# Patient Record
Sex: FEMALE | Race: BLACK OR AFRICAN AMERICAN | NOT HISPANIC OR LATINO | URBAN - METROPOLITAN AREA
[De-identification: names, ages, dates, MRNs, and addresses within clinical notes are randomized per-mention and may not be internally consistent; named-entity substitution may affect disease eponyms.]

---

## 2020-07-25 ENCOUNTER — TELEPHONE ENCOUNTER (OUTPATIENT)
Dept: URBAN - METROPOLITAN AREA CLINIC 13 | Facility: CLINIC | Age: 45
End: 2020-07-25

## 2020-07-26 ENCOUNTER — TELEPHONE ENCOUNTER (OUTPATIENT)
Dept: URBAN - METROPOLITAN AREA CLINIC 13 | Facility: CLINIC | Age: 45
End: 2020-07-26

## 2020-07-26 RX ORDER — ISOPROPYL ALCOHOL 91 ML/100ML
TAKE 1 CAPSULE AT BEDTIME LIQUID TOPICAL
Refills: 0 | Status: ACTIVE | COMMUNITY
Start: 2011-08-01

## 2022-11-01 ENCOUNTER — TELEPHONE ENCOUNTER (OUTPATIENT)
Dept: URBAN - METROPOLITAN AREA CLINIC 107 | Facility: CLINIC | Age: 47
End: 2022-11-01

## 2022-11-21 ENCOUNTER — WEB ENCOUNTER (OUTPATIENT)
Dept: URBAN - METROPOLITAN AREA CLINIC 113 | Facility: CLINIC | Age: 47
End: 2022-11-21

## 2022-11-21 ENCOUNTER — OFFICE VISIT (OUTPATIENT)
Dept: URBAN - METROPOLITAN AREA CLINIC 113 | Facility: CLINIC | Age: 47
End: 2022-11-21
Payer: COMMERCIAL

## 2022-11-21 VITALS
RESPIRATION RATE: 20 BRPM | HEART RATE: 80 BPM | WEIGHT: 233.6 LBS | SYSTOLIC BLOOD PRESSURE: 145 MMHG | BODY MASS INDEX: 42.99 KG/M2 | DIASTOLIC BLOOD PRESSURE: 102 MMHG | TEMPERATURE: 97.3 F | HEIGHT: 62 IN

## 2022-11-21 DIAGNOSIS — R10.33 PERIUMBILICAL ABDOMINAL PAIN: ICD-10-CM

## 2022-11-21 DIAGNOSIS — K21.9 GASTROESOPHAGEAL REFLUX DISEASE, UNSPECIFIED WHETHER ESOPHAGITIS PRESENT: ICD-10-CM

## 2022-11-21 DIAGNOSIS — K50.10 CROHN'S DISEASE OF COLON WITHOUT COMPLICATION: ICD-10-CM

## 2022-11-21 DIAGNOSIS — K59.09 CHRONIC CONSTIPATION: ICD-10-CM

## 2022-11-21 DIAGNOSIS — R13.14 PHARYNGOESOPHAGEAL DYSPHAGIA: ICD-10-CM

## 2022-11-21 PROCEDURE — 99214 OFFICE O/P EST MOD 30 MIN: CPT | Performed by: NURSE PRACTITIONER

## 2022-11-21 RX ORDER — ISOPROPYL ALCOHOL 91 ML/100ML
TAKE 1 CAPSULE AT BEDTIME LIQUID TOPICAL
Refills: 0 | Status: ACTIVE | COMMUNITY
Start: 2011-08-01

## 2022-11-21 RX ORDER — FAMOTIDINE 40 MG/1
1 TABLET AT BEDTIME TABLET, FILM COATED ORAL ONCE A DAY
Qty: 30 | Refills: 11 | OUTPATIENT

## 2022-11-21 RX ORDER — PANTOPRAZOLE SODIUM 40 MG/1
1 TABLET TABLET, DELAYED RELEASE ORAL ONCE A DAY
Status: ACTIVE | COMMUNITY

## 2022-11-21 RX ORDER — LINACLOTIDE 145 UG/1
1 CAPSULE AT LEAST 30 MINUTES BEFORE THE FIRST MEAL OF THE DAY ON AN EMPTY STOMACH CAPSULE, GELATIN COATED ORAL ONCE A DAY
Qty: 30 | Refills: 11 | OUTPATIENT

## 2022-11-21 RX ORDER — FAMOTIDINE 20 MG/1
1 TABLET AT BEDTIME AS NEEDED TABLET, FILM COATED ORAL ONCE A DAY
Status: ACTIVE | COMMUNITY

## 2022-11-21 RX ORDER — ADALIMUMAB 40MG/0.8ML
0.8 ML KIT SUBCUTANEOUS
Status: ACTIVE | COMMUNITY

## 2022-11-21 RX ORDER — DICYCLOMINE HYDROCHLORIDE 10 MG/1
2 CAPSULES CAPSULE ORAL
Status: ACTIVE | COMMUNITY

## 2022-11-21 RX ORDER — SUCRALFATE 1 G/1
1 TABLET ON AN EMPTY STOMACH TABLET ORAL
Status: ACTIVE | COMMUNITY

## 2022-11-21 NOTE — HPI-TODAY'S VISIT:
This is a 46-year-old female with a history of anxiety, Crohn's colitis presenting for follow-up regarding a possible hernia. She was last seen in hospital consultation 4/26/2020 for Crohn's disease.  She reported that she had been followed by multiple gastroenterologists over years.  She reported that she had been doing well until 1 month prior to hospitalization.  She developed bloody diarrhea.  She had been on Humira previously up to the month prior to hospitalization.  She had presented to the hospital in [place] and reported a normal colonoscopy.  She was instructed to leave the hospital because of risk of coronavirus and she stated Dr. Wallace was unable to see her because of coronavirus issues.  She decided to present to the emergency department at [facility] for another opinion.  It was noted that she had been on Remicade in the past and for 4 years had been on Humira.  CT scan at [facility] demonstrated colitis in the ascending colon.  Labs were notable for iron deficiency with a hemoglobin of 8.1.  Her INR was elevated at 1.8.  She was not on Coumadin.  Liver enzymes and chemistries were normal.  She underwent a colonoscopy 4/27/2020 notable for a fair prep, congested, erythematous, red, and pseudomembrane covered mucosa in the entire examined colon status post biopsy.  Pathology: Cecal biopsy demonstrated chronic active colitis with focal intramucosal granuloma and random colon biopsies demonstrated chronic active colitis.  She has been under the care of Dr. Alexander (gastroenterologist in [place]).  Due to persistent abdominal pain, she decided to come here for evaluation. She reports pain indicated in the periumbilical and epigastric areas.  This is constant and worse when she eats.  She takes dicyclomine 10 mg 2 tablets and sucralfate which "calms it down."  She reports a sensation that food is "stopping" indicated in the epigastrium and "will not digest."  She reports worse symptoms associated with eating meat or rice.  She admits difficulty swallowing describing solid food lodging at the base of her throat relieved with drinking liquids.  She has daily episodes of heartburn on pantoprazole 40 mg daily and famotidine 40 mg at bedtime.  She admits occasional nausea.  She has Linzess 290 mcg and lactulose on hand for constipation.  If she takes Linzess daily, she has frequent diarrhea.  If she uses it as needed, she requires 2 capsules.  She uses lactulose as needed if she has bloating and Linzess is not effective.  She reports 2 days without a bowel movement after she takes a dose of Linzess.  Her stools are "runny" when she takes medication.  She denies red blood per rectum or melena.  She denies other abdominal symptoms.  She is unsure regarding weight loss. She reports an EGD and colonoscopy have been performed at [facility] within the last 2 months.  She also reports labs as well ordered by her gastroenterologist.  She has not had a recent CT scan.  She reports an upper GI series a month ago.  There has been discussion that abdominal pain may be associated with "loose mesh" from a prior hernia repair.  She has seen a surgeon in [place] who informed her there was nothing that could be done.  She is concerned regarding ongoing symptoms. She is compliant with Humira 40 mg subcu every 2 weeks.  She states she has been on this medication for 7 to 8 years.

## 2022-11-21 NOTE — HPI-OTHER HISTORIES
Labs 11/7/2022:SLE profile normal with exception of anti-DNA elevated at 17.  AMOL comprehensive panel normal.  RA less than 10.  ESR 89.  CRP 15.  Colonoscopy 4/27/2020 notable for a fair prep, congested, erythematous, red, and pseudomembrane covered mucosa in the entire examined colon status post biopsy.  Pathology: Cecal biopsy demonstrated chronic active colitis with focal intramucosal granuloma and random colon biopsies demonstrated chronic active colitis.

## 2022-11-23 PROBLEM — 40739000: Status: ACTIVE | Noted: 2022-11-21

## 2022-12-02 ENCOUNTER — TELEPHONE ENCOUNTER (OUTPATIENT)
Dept: URBAN - METROPOLITAN AREA CLINIC 113 | Facility: CLINIC | Age: 47
End: 2022-12-02

## 2022-12-02 RX ORDER — DICYCLOMINE HYDROCHLORIDE 10 MG/1
1 TABLET CAPSULE ORAL
Qty: 60 | Refills: 6

## 2022-12-02 RX ORDER — PANTOPRAZOLE SODIUM 40 MG/1
1 TABLET TABLET, DELAYED RELEASE ORAL ONCE A DAY
Qty: 30 TABLET | Refills: 6

## 2022-12-13 ENCOUNTER — OFFICE VISIT (OUTPATIENT)
Dept: URBAN - METROPOLITAN AREA CLINIC 107 | Facility: CLINIC | Age: 47
End: 2022-12-13

## 2022-12-20 ENCOUNTER — TELEPHONE ENCOUNTER (OUTPATIENT)
Dept: URBAN - METROPOLITAN AREA CLINIC 113 | Facility: CLINIC | Age: 47
End: 2022-12-20

## 2022-12-20 RX ORDER — CHLORDIAZEPOXIDE HYDROCHLORIDE AND CLIDINIUM BROMIDE 5; 2.5 MG/1; MG/1
1 CAPSULE BEFORE MEALS CAPSULE ORAL THREE TIMES A DAY
Qty: 90 | OUTPATIENT
Start: 2022-12-21 | End: 2023-01-20

## 2023-01-05 ENCOUNTER — TELEPHONE ENCOUNTER (OUTPATIENT)
Dept: URBAN - METROPOLITAN AREA CLINIC 113 | Facility: CLINIC | Age: 48
End: 2023-01-05

## 2023-01-05 RX ORDER — PANTOPRAZOLE SODIUM 40 MG/1
1 TABLET TABLET, DELAYED RELEASE ORAL TWICE A DAY
Qty: 60 TABLET | Refills: 6

## 2023-01-05 RX ORDER — FAMOTIDINE 40 MG/1
1 TABLET TABLET, FILM COATED ORAL TWICE A DAY
Qty: 60 TABLET | Refills: 6

## 2023-01-13 ENCOUNTER — OFFICE VISIT (OUTPATIENT)
Dept: URBAN - METROPOLITAN AREA CLINIC 107 | Facility: CLINIC | Age: 48
End: 2023-01-13

## 2023-01-13 RX ORDER — PANTOPRAZOLE SODIUM 40 MG/1
1 TABLET TABLET, DELAYED RELEASE ORAL TWICE A DAY
Qty: 60 TABLET | Refills: 6 | Status: ACTIVE | COMMUNITY

## 2023-01-13 RX ORDER — CHLORDIAZEPOXIDE HYDROCHLORIDE AND CLIDINIUM BROMIDE 5; 2.5 MG/1; MG/1
1 CAPSULE BEFORE MEALS CAPSULE ORAL THREE TIMES A DAY
Qty: 90 | Status: ACTIVE | COMMUNITY
Start: 2022-12-21 | End: 2023-01-20

## 2023-01-13 RX ORDER — ADALIMUMAB 40MG/0.8ML
0.8 ML KIT SUBCUTANEOUS
Status: ACTIVE | COMMUNITY

## 2023-01-13 RX ORDER — FAMOTIDINE 40 MG/1
1 TABLET TABLET, FILM COATED ORAL TWICE A DAY
Qty: 60 TABLET | Refills: 6 | Status: ACTIVE | COMMUNITY

## 2023-01-13 RX ORDER — LINACLOTIDE 145 UG/1
1 CAPSULE AT LEAST 30 MINUTES BEFORE THE FIRST MEAL OF THE DAY ON AN EMPTY STOMACH CAPSULE, GELATIN COATED ORAL ONCE A DAY
Qty: 30 | Refills: 11 | Status: ACTIVE | COMMUNITY

## 2023-01-13 RX ORDER — FAMOTIDINE 20 MG/1
1 TABLET AT BEDTIME AS NEEDED TABLET, FILM COATED ORAL ONCE A DAY
Status: ACTIVE | COMMUNITY

## 2023-01-13 RX ORDER — ISOPROPYL ALCOHOL 91 ML/100ML
TAKE 1 CAPSULE AT BEDTIME LIQUID TOPICAL
Refills: 0 | Status: ACTIVE | COMMUNITY
Start: 2011-08-01

## 2023-01-13 RX ORDER — SUCRALFATE 1 G/1
1 TABLET ON AN EMPTY STOMACH TABLET ORAL
Status: ACTIVE | COMMUNITY

## 2023-01-13 RX ORDER — DICYCLOMINE HYDROCHLORIDE 10 MG/1
1 TABLET CAPSULE ORAL
Qty: 60 | Refills: 6 | Status: ACTIVE | COMMUNITY

## 2023-01-13 NOTE — HPI-TODAY'S VISIT:
This is a 47-year-old female with a history of anxiety, Crohn's colitis on Humira 40 mg subcu every 2 weeks, GERD, pharyngoesophageal dysphagia, periumbilical abdominal pain, and chronic constipation presenting for follow-up. She was last seen 11/21/2022 after a long interval.  She had been under the care of Dr. Alexander in [place].  Due to persistent abdominal pain, she had return for further evaluation.  It was noted that colonoscopy in 2020 demonstrated active disease.  She reported recent EGD and colonoscopy within a 2-month period of time at [facility].  She reported an upper GI series a month prior as well.  She stated there was discussion that abdominal pain may be associated with "loose mesh" from prior surgical repair.  Her surgeon in [place] had informed her there was nothing that could be done.  She was scheduled for a CT scan.  She was to continue dicyclomine and Carafate as needed.  She had daily symptoms of acid reflux on pantoprazole and famotidine.  It was discussed there may be if component of functional dyspepsia.  She was to continue Carafate.  Records were requested.  Constipation was suboptimally managed and chronic.  She reported diarrhea associated with taking Linzess 290 mcg daily.  A trial of Linzess 145 mcg daily was recommended.  She was to continue lactulose as needed and consider decreasing Linzess to 72 mcg if she had diarrhea on 145.  She called our office 12/20/2022 requesting medication for abdominal pain after her CT scan.  Dicyclomine was ineffective.  CT was reviewed by Dr. Vu as negative.  She was prescribed Librax. She called our office 1/5/2023 reporting she was taking pantoprazole and famotidine twice a day for worsening reflux.  She reported this "slowed" her heartburn but did not resolve her symptoms.  She reported that Trulance was not working for constipation and that she did not have an appetite.  Pantoprazole and famotidine were refilled for twice daily therapy.  Gastric emptying study or smart pill were considerations.  A return call was made to the patient and a voicemail was left.

## 2023-01-24 ENCOUNTER — OFFICE VISIT (OUTPATIENT)
Dept: URBAN - METROPOLITAN AREA CLINIC 107 | Facility: CLINIC | Age: 48
End: 2023-01-24

## 2023-01-25 ENCOUNTER — WEB ENCOUNTER (OUTPATIENT)
Dept: URBAN - METROPOLITAN AREA CLINIC 113 | Facility: CLINIC | Age: 48
End: 2023-01-25

## 2023-01-27 ENCOUNTER — CLAIMS CREATED FROM THE CLAIM WINDOW (OUTPATIENT)
Dept: URBAN - METROPOLITAN AREA CLINIC 113 | Facility: CLINIC | Age: 48
End: 2023-01-27
Payer: COMMERCIAL

## 2023-01-27 VITALS
HEART RATE: 75 BPM | WEIGHT: 227 LBS | RESPIRATION RATE: 18 BRPM | BODY MASS INDEX: 41.77 KG/M2 | SYSTOLIC BLOOD PRESSURE: 98 MMHG | DIASTOLIC BLOOD PRESSURE: 69 MMHG | HEIGHT: 62 IN | TEMPERATURE: 97.1 F

## 2023-01-27 DIAGNOSIS — K31.A0 INTESTINAL METAPLASIA OF GASTRIC MUCOSA: ICD-10-CM

## 2023-01-27 DIAGNOSIS — K21.9 GASTROESOPHAGEAL REFLUX DISEASE, UNSPECIFIED WHETHER ESOPHAGITIS PRESENT: ICD-10-CM

## 2023-01-27 DIAGNOSIS — K59.09 CHRONIC CONSTIPATION: ICD-10-CM

## 2023-01-27 DIAGNOSIS — R10.33 PERIUMBILICAL ABDOMINAL PAIN: ICD-10-CM

## 2023-01-27 DIAGNOSIS — K50.10 CROHN'S DISEASE OF COLON WITHOUT COMPLICATION: ICD-10-CM

## 2023-01-27 PROCEDURE — 99214 OFFICE O/P EST MOD 30 MIN: CPT | Performed by: NURSE PRACTITIONER

## 2023-01-27 RX ORDER — FAMOTIDINE 40 MG/1
1 TABLET TABLET, FILM COATED ORAL TWICE A DAY
Qty: 60 TABLET | Refills: 6 | Status: ACTIVE | COMMUNITY

## 2023-01-27 RX ORDER — LINACLOTIDE 145 UG/1
1 CAPSULE AT LEAST 30 MINUTES BEFORE THE FIRST MEAL OF THE DAY ON AN EMPTY STOMACH CAPSULE, GELATIN COATED ORAL ONCE A DAY
Qty: 30 | Refills: 11 | Status: ON HOLD | COMMUNITY

## 2023-01-27 RX ORDER — HYDROCHLOROTHIAZIDE 25 MG/1
1 TABLET IN THE MORNING TABLET ORAL ONCE A DAY
Status: ACTIVE | COMMUNITY

## 2023-01-27 RX ORDER — FAMOTIDINE 20 MG/1
1 TABLET AT BEDTIME AS NEEDED TABLET, FILM COATED ORAL ONCE A DAY
Status: ACTIVE | COMMUNITY

## 2023-01-27 RX ORDER — VALSARTAN 80 MG/1
1 TABLET TABLET, FILM COATED ORAL ONCE A DAY
Status: ACTIVE | COMMUNITY

## 2023-01-27 RX ORDER — DICYCLOMINE HYDROCHLORIDE 10 MG/1
1 TABLET CAPSULE ORAL
Qty: 60 | Refills: 6 | Status: ACTIVE | COMMUNITY

## 2023-01-27 RX ORDER — ISOPROPYL ALCOHOL 91 ML/100ML
TAKE 1 CAPSULE AT BEDTIME LIQUID TOPICAL
Refills: 0 | Status: ACTIVE | COMMUNITY
Start: 2011-08-01

## 2023-01-27 RX ORDER — PLECANATIDE 3 MG/1
1 TABLET TABLET ORAL ONCE A DAY
Status: ACTIVE | COMMUNITY

## 2023-01-27 RX ORDER — PANTOPRAZOLE SODIUM 40 MG/1
1 TABLET TABLET, DELAYED RELEASE ORAL TWICE A DAY
Qty: 60 TABLET | Refills: 6 | Status: ACTIVE | COMMUNITY

## 2023-01-27 RX ORDER — ADALIMUMAB 40MG/0.8ML
0.8 ML KIT SUBCUTANEOUS
Status: ACTIVE | COMMUNITY

## 2023-01-27 RX ORDER — POLYETHYLENE GLYCOL 3350, SODIUM CHLORIDE, SODIUM BICARBONATE, POTASSIUM CHLORIDE 420; 11.2; 5.72; 1.48 G/4L; G/4L; G/4L; G/4L
WHILE ON A CLEAR LIQUID DIET DRINK 8 OZ EVERY 15 MIN UNTIL GONE 2 DAYS BEFORE COLONOSCOPY AND DRINK SECOND BOTTLE AS DIRECTED ON PREP SHEET DAY BEFORE THE COLONOSCOPY POWDER, FOR SOLUTION ORAL
Qty: 2 PACK | Refills: 0 | OUTPATIENT

## 2023-01-27 RX ORDER — SUCRALFATE 1 G/1
1 TABLET ON AN EMPTY STOMACH TABLET ORAL
Status: ACTIVE | COMMUNITY

## 2023-01-27 NOTE — HPI-TODAY'S VISIT:
This is a 47-year-old female with a history of anxiety, Crohn's colitis on Humira 40 mg subcu every 2 weeks, GERD, pharyngoesophageal dysphagia, periumbilical abdominal pain, and chronic constipation presenting for follow-up. She was last seen 11/21/2022 after a long interval.  She had been under the care of Dr. Alexander in [place].  Due to persistent abdominal pain, she had return for further evaluation.  It was noted that colonoscopy in 2020 demonstrated active disease.  She reported recent EGD and colonoscopy within a 2-month period of time at [facility].  She reported an upper GI series a month prior as well.  She stated there was discussion that abdominal pain may be associated with "loose mesh" from prior surgical repair.  Her surgeon in [place] had informed her there was nothing that could be done.  She was scheduled for a CT scan.  She was to continue dicyclomine and Carafate as needed.  She had daily symptoms of acid reflux on pantoprazole and famotidine.  It was discussed there may be if component of functional dyspepsia.  She was to continue Carafate.  Records were requested.  Constipation was suboptimally managed and chronic.  She reported diarrhea associated with taking Linzess 290 mcg daily.  A trial of Linzess 145 mcg daily was recommended.  She was to continue lactulose as needed and consider decreasing Linzess to 72 mcg if she had diarrhea on 145.  She called our office 12/20/2022 requesting medication for abdominal pain after her CT scan.  Dicyclomine was ineffective.  CT was reviewed by Dr. Vu as negative.  She was prescribed Librax. She called our office 1/5/2023 reporting she was taking pantoprazole and famotidine twice a day for worsening reflux.  She reported this "slowed" her heartburn but did not resolve her symptoms.  She reported that Trulance was not working for constipation and that she did not have an appetite.  Pantoprazole and famotidine were refilled for twice daily therapy.  Gastric emptying study or smart pill were considerations.  A return call was made to the patient and a voicemail was left. She reports insurance will not cover Librax.  She continues to take dicyclomine for abdominal pain.  This is ineffective.  She has constant pain in her abdomen indicated in the suprapubic area.  She reports that she sits up night to sleep and holds her side and rocks.  She continues to have constipation.  She is taking Trulance daily.  She is taking MiraLAX 1 capful twice a day.  She reports small-volume stools once or twice a week.  Stools are hard and p.o. are watery.  She has to strain to pass a bowel movement.  She has increased water intake and increased ingestion of vegetables.  Her symptoms are persistent.  She has worsening gas and bloating and reports she is "miserable."  She reports mild intermittent nausea without vomiting.  She is taking famotidine twice a day, pantoprazole 40 mg twice a day, and uses Carafate 1 g 2 or 3 times a day.  She has breakthrough heartburn at night reporting some relief with Pepcid.  She has burning into her throat.  She denies difficulty swallowing.  She denies any other abdominal symptoms. She reports abdominal pain improved after a hernia repair 2 years ago and then symptoms recurred. Records were obtained today from [facility] and are outlined below.

## 2023-01-27 NOTE — HPI-OTHER HISTORIES
CT abdomen and pelvis with contrast 12/20/2022:No acute inflammation or free fluid.  No bowel obstruction.  Appendix is normal. EGD 8/16/2022:Normal esophagus, no evidence of hiatal hernia, appropriately positioned gastric fundoplication, no inflammatory changes gastric body or other lesions noted, normal first inspected portion of the duodenum.  Bravo pH probe negative for pathologic reflux.  Gastric biopsy taken for CLOtest. EGD 4/26/2022:Esophageal mucosal variant, esophageal stenosis status post dilation, medium size hiatal hernia, loose appearing fundoplication, gastritis, erosive gastropathy, normal examined duodenum.  Antral biopsy demonstrated chronic antral gastritis with reactive/chemical gastropathy type changes, intestinal metaplasia, negative for dysplasia, negative for H. pylori. Colonoscopy 3/9/2022:Poor prep, normal examined terminal ileum, tortuous colon, stool in the entire examined colon, pseudopolyps in transverse colon status post biopsy, pseudopolyps in the descending colon status post biopsy, diffuse mild mucosal changes were found in the rectosigmoid colon, descending colon, transverse colon, ascending colon secondary to colitis status post biopsy.  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

## 2023-01-28 ENCOUNTER — LAB OUTSIDE AN ENCOUNTER (OUTPATIENT)
Dept: URBAN - METROPOLITAN AREA CLINIC 113 | Facility: CLINIC | Age: 48
End: 2023-01-28

## 2023-01-28 PROBLEM — 236069009: Status: ACTIVE | Noted: 2022-11-21

## 2023-02-02 PROBLEM — 50440006: Status: ACTIVE | Noted: 2022-11-21

## 2023-02-13 ENCOUNTER — TELEPHONE ENCOUNTER (OUTPATIENT)
Dept: URBAN - METROPOLITAN AREA CLINIC 113 | Facility: CLINIC | Age: 48
End: 2023-02-13

## 2023-02-13 RX ORDER — ADALIMUMAB 40MG/0.8ML: 0.8 ML KIT SUBCUTANEOUS

## 2023-03-17 ENCOUNTER — TELEPHONE ENCOUNTER (OUTPATIENT)
Dept: URBAN - METROPOLITAN AREA CLINIC 113 | Facility: CLINIC | Age: 48
End: 2023-03-17

## 2023-03-17 RX ORDER — POLYETHYLENE GLYCOL 3350, SODIUM CHLORIDE, SODIUM BICARBONATE, POTASSIUM CHLORIDE 420; 11.2; 5.72; 1.48 G/4L; G/4L; G/4L; G/4L
WHILE ON A CLEAR LIQUID DIET DRINK 8 OZ EVERY 15 MIN UNTIL GONE 2 DAYS BEFORE COLONOSCOPY AND DRINK SECOND BOTTLE AS DIRECTED ON PREP SHEET DAY BEFORE THE COLONOSCOPY POWDER, FOR SOLUTION ORAL
Qty: 2 PACK | Refills: 0
End: 2023-03-19

## 2023-03-20 ENCOUNTER — TELEPHONE ENCOUNTER (OUTPATIENT)
Dept: URBAN - METROPOLITAN AREA CLINIC 113 | Facility: CLINIC | Age: 48
End: 2023-03-20

## 2023-03-20 RX ORDER — POLYETHYLENE GLYCOL 3350, SODIUM SULFATE ANHYDROUS, SODIUM BICARBONATE, SODIUM CHLORIDE, POTASSIUM CHLORIDE 236; 22.74; 6.74; 5.86; 2.97 G/4L; G/4L; G/4L; G/4L; G/4L
AS DIRECTED POWDER, FOR SOLUTION ORAL ONCE
Qty: 236 GM | Refills: 0 | OUTPATIENT
Start: 2023-03-20 | End: 2023-03-21

## 2023-03-27 ENCOUNTER — CLAIMS CREATED FROM THE CLAIM WINDOW (OUTPATIENT)
Dept: URBAN - METROPOLITAN AREA CLINIC 4 | Facility: CLINIC | Age: 48
End: 2023-03-27
Payer: COMMERCIAL

## 2023-03-27 ENCOUNTER — OFFICE VISIT (OUTPATIENT)
Dept: URBAN - METROPOLITAN AREA SURGERY CENTER 25 | Facility: SURGERY CENTER | Age: 48
End: 2023-03-27
Payer: COMMERCIAL

## 2023-03-27 DIAGNOSIS — K50.811 CROHN'S DISEASE OF BOTH SMALL AND LARGE INTESTINE WITH RECTAL BLEEDING: ICD-10-CM

## 2023-03-27 DIAGNOSIS — K63.89 OTHER SPECIFIED DISEASES OF INTESTINE: ICD-10-CM

## 2023-03-27 PROCEDURE — 45380 COLONOSCOPY AND BIOPSY: CPT | Performed by: INTERNAL MEDICINE

## 2023-03-27 PROCEDURE — 88305 TISSUE EXAM BY PATHOLOGIST: CPT | Performed by: PATHOLOGY

## 2023-03-27 PROCEDURE — G8907 PT DOC NO EVENTS ON DISCHARG: HCPCS | Performed by: INTERNAL MEDICINE

## 2023-03-27 RX ORDER — VALSARTAN 80 MG/1
1 TABLET TABLET, FILM COATED ORAL ONCE A DAY
Status: ACTIVE | COMMUNITY

## 2023-03-27 RX ORDER — PLECANATIDE 3 MG/1
1 TABLET TABLET ORAL ONCE A DAY
Status: ACTIVE | COMMUNITY

## 2023-03-27 RX ORDER — FAMOTIDINE 20 MG/1
1 TABLET AT BEDTIME AS NEEDED TABLET, FILM COATED ORAL ONCE A DAY
Status: ACTIVE | COMMUNITY

## 2023-03-27 RX ORDER — HYDROCHLOROTHIAZIDE 25 MG/1
1 TABLET IN THE MORNING TABLET ORAL ONCE A DAY
Status: ACTIVE | COMMUNITY

## 2023-03-27 RX ORDER — LINACLOTIDE 145 UG/1
1 CAPSULE AT LEAST 30 MINUTES BEFORE THE FIRST MEAL OF THE DAY ON AN EMPTY STOMACH CAPSULE, GELATIN COATED ORAL ONCE A DAY
Qty: 30 | Refills: 11 | Status: ON HOLD | COMMUNITY

## 2023-03-27 RX ORDER — ISOPROPYL ALCOHOL 91 ML/100ML
TAKE 1 CAPSULE AT BEDTIME LIQUID TOPICAL
Refills: 0 | Status: ACTIVE | COMMUNITY
Start: 2011-08-01

## 2023-03-27 RX ORDER — SUCRALFATE 1 G/1
1 TABLET ON AN EMPTY STOMACH TABLET ORAL
Status: ACTIVE | COMMUNITY

## 2023-03-27 RX ORDER — ADALIMUMAB 40MG/0.8ML
0.8 ML KIT SUBCUTANEOUS
Status: ACTIVE | COMMUNITY

## 2023-03-27 RX ORDER — FAMOTIDINE 40 MG/1
1 TABLET TABLET, FILM COATED ORAL TWICE A DAY
Qty: 60 TABLET | Refills: 6 | Status: ACTIVE | COMMUNITY

## 2023-03-27 RX ORDER — DICYCLOMINE HYDROCHLORIDE 10 MG/1
1 TABLET CAPSULE ORAL
Qty: 60 | Refills: 6 | Status: ACTIVE | COMMUNITY

## 2023-03-27 RX ORDER — PANTOPRAZOLE SODIUM 40 MG/1
1 TABLET TABLET, DELAYED RELEASE ORAL TWICE A DAY
Qty: 60 TABLET | Refills: 6 | Status: ACTIVE | COMMUNITY

## 2023-05-22 ENCOUNTER — OFFICE VISIT (OUTPATIENT)
Dept: URBAN - METROPOLITAN AREA CLINIC 113 | Facility: CLINIC | Age: 48
End: 2023-05-22

## 2023-05-22 RX ORDER — FAMOTIDINE 20 MG/1
1 TABLET AT BEDTIME AS NEEDED TABLET, FILM COATED ORAL ONCE A DAY
Status: ACTIVE | COMMUNITY

## 2023-05-22 RX ORDER — PANTOPRAZOLE SODIUM 40 MG/1
1 TABLET TABLET, DELAYED RELEASE ORAL TWICE A DAY
Qty: 60 TABLET | Refills: 6 | Status: ACTIVE | COMMUNITY

## 2023-05-22 RX ORDER — VALSARTAN 80 MG/1
1 TABLET TABLET, FILM COATED ORAL ONCE A DAY
Status: ACTIVE | COMMUNITY

## 2023-05-22 RX ORDER — FAMOTIDINE 40 MG/1
1 TABLET TABLET, FILM COATED ORAL TWICE A DAY
Qty: 60 TABLET | Refills: 6 | Status: ACTIVE | COMMUNITY

## 2023-05-22 RX ORDER — SUCRALFATE 1 G/1
1 TABLET ON AN EMPTY STOMACH TABLET ORAL
Status: ACTIVE | COMMUNITY

## 2023-05-22 RX ORDER — ADALIMUMAB 40MG/0.8ML
0.8 ML KIT SUBCUTANEOUS
Status: ACTIVE | COMMUNITY

## 2023-05-22 RX ORDER — DICYCLOMINE HYDROCHLORIDE 10 MG/1
1 TABLET CAPSULE ORAL
Qty: 60 | Refills: 6 | Status: ACTIVE | COMMUNITY

## 2023-05-22 RX ORDER — PLECANATIDE 3 MG/1
1 TABLET TABLET ORAL ONCE A DAY
Status: ACTIVE | COMMUNITY

## 2023-05-22 RX ORDER — LINACLOTIDE 145 UG/1
1 CAPSULE AT LEAST 30 MINUTES BEFORE THE FIRST MEAL OF THE DAY ON AN EMPTY STOMACH CAPSULE, GELATIN COATED ORAL ONCE A DAY
Qty: 30 | Refills: 11 | Status: ON HOLD | COMMUNITY

## 2023-05-22 RX ORDER — HYDROCHLOROTHIAZIDE 25 MG/1
1 TABLET IN THE MORNING TABLET ORAL ONCE A DAY
Status: ACTIVE | COMMUNITY

## 2023-05-22 RX ORDER — ISOPROPYL ALCOHOL 91 ML/100ML
TAKE 1 CAPSULE AT BEDTIME LIQUID TOPICAL
Refills: 0 | Status: ACTIVE | COMMUNITY
Start: 2011-08-01

## 2023-05-22 NOTE — HPI-TODAY'S VISIT:
This is a 47-year-old female with a history of anxiety, Crohn's colitis on Humira 40 mg subcu every 2 weeks, GERD, pharyngoesophageal dysphagia, periumbilical abdominal pain, and chronic constipation presenting for follow-up. She was last seen 11/21/2022 after a long interval.  She had been under the care of Dr. Alexander in [place].  Due to persistent abdominal pain, she had return for further evaluation.  It was noted that colonoscopy in 2020 demonstrated active disease.  She reported recent EGD and colonoscopy within a 2-month period of time at [facility].  She reported an upper GI series a month prior as well.  She stated there was discussion that abdominal pain may be associated with "loose mesh" from prior surgical repair.  Her surgeon in [place] had informed her there was nothing that could be done.  She was scheduled for a CT scan.  She was to continue dicyclomine and Carafate as needed.  She had daily symptoms of acid reflux on pantoprazole and famotidine.  It was discussed there may be if component of functional dyspepsia.  She was to continue Carafate.  Records were requested.  Constipation was suboptimally managed and chronic.  She reported diarrhea associated with taking Linzess 290 mcg daily.  A trial of Linzess 145 mcg daily was recommended.  She was to continue lactulose as needed and consider decreasing Linzess to 72 mcg if she had diarrhea on 145.  She called our office 12/20/2022 requesting medication for abdominal pain after her CT scan.  Dicyclomine was ineffective.  CT was reviewed by Dr. Vu as negative.  She was prescribed Librax. She called our office 1/5/2023 reporting she was taking pantoprazole and famotidine twice a day for worsening reflux.  She reported this "slowed" her heartburn but did not resolve her symptoms.  She reported that Trulance was not working for constipation and that she did not have an appetite.  Pantoprazole and famotidine were refilled for twice daily therapy.  Gastric emptying study or smart pill were considerations.  A return call was made to the patient and a voicemail was left. She reports insurance will not cover Librax.  She continues to take dicyclomine for abdominal pain.  This is ineffective.  She has constant pain in her abdomen indicated in the suprapubic area.  She reports that she sits up night to sleep and holds her side and rocks.  She continues to have constipation.  She is taking Trulance daily.  She is taking MiraLAX 1 capful twice a day.  She reports small-volume stools once or twice a week.  Stools are hard and p.o. are watery.  She has to strain to pass a bowel movement.  She has increased water intake and increased ingestion of vegetables.  Her symptoms are persistent.  She has worsening gas and bloating and reports she is "miserable."  She reports mild intermittent nausea without vomiting.  She is taking famotidine twice a day, pantoprazole 40 mg twice a day, and uses Carafate 1 g 2 or 3 times a day.  She has breakthrough heartburn at night reporting some relief with Pepcid.  She has burning into her throat.  She denies difficulty swallowing.  She denies any other abdominal symptoms. She reports abdominal pain improved after a hernia repair 2 years ago and then symptoms recurred. Records were obtained today from [facility] and are outlined below.

## 2023-06-05 ENCOUNTER — TELEPHONE ENCOUNTER (OUTPATIENT)
Dept: URBAN - METROPOLITAN AREA CLINIC 113 | Facility: CLINIC | Age: 48
End: 2023-06-05

## 2023-06-05 RX ORDER — FAMOTIDINE 20 MG/1
1 TABLET TABLET, FILM COATED ORAL TWICE A DAY
Qty: 60 TABLET | Refills: 3

## 2023-06-05 RX ORDER — PANTOPRAZOLE SODIUM 40 MG/1
1 TABLET TABLET, DELAYED RELEASE ORAL TWICE A DAY
Qty: 60 TABLET | Refills: 3

## 2023-06-05 RX ORDER — ADALIMUMAB 40MG/0.8ML
0.8 ML KIT SUBCUTANEOUS EVERY 2 WEEKS
Qty: 1 | Refills: 11

## 2023-07-10 ENCOUNTER — OFFICE VISIT (OUTPATIENT)
Dept: URBAN - METROPOLITAN AREA CLINIC 107 | Facility: CLINIC | Age: 48
End: 2023-07-10

## 2023-07-10 RX ORDER — SUCRALFATE 1 G/1
1 TABLET ON AN EMPTY STOMACH TABLET ORAL
Status: ACTIVE | COMMUNITY

## 2023-07-10 RX ORDER — VALSARTAN 80 MG/1
1 TABLET TABLET, FILM COATED ORAL ONCE A DAY
Status: ACTIVE | COMMUNITY

## 2023-07-10 RX ORDER — HYDROCHLOROTHIAZIDE 25 MG/1
1 TABLET IN THE MORNING TABLET ORAL ONCE A DAY
Status: ACTIVE | COMMUNITY

## 2023-07-10 RX ORDER — FAMOTIDINE 40 MG/1
1 TABLET TABLET, FILM COATED ORAL TWICE A DAY
Qty: 60 TABLET | Refills: 6 | Status: ACTIVE | COMMUNITY

## 2023-07-10 RX ORDER — DICYCLOMINE HYDROCHLORIDE 10 MG/1
1 TABLET CAPSULE ORAL
Qty: 60 | Refills: 6 | Status: ACTIVE | COMMUNITY

## 2023-07-10 RX ORDER — PANTOPRAZOLE SODIUM 40 MG/1
1 TABLET TABLET, DELAYED RELEASE ORAL TWICE A DAY
Qty: 60 TABLET | Refills: 3 | Status: ACTIVE | COMMUNITY

## 2023-07-10 RX ORDER — FAMOTIDINE 20 MG/1
1 TABLET TABLET, FILM COATED ORAL TWICE A DAY
Qty: 60 TABLET | Refills: 3 | Status: ACTIVE | COMMUNITY

## 2023-07-10 RX ORDER — ADALIMUMAB 40MG/0.8ML
0.8 ML KIT SUBCUTANEOUS EVERY 2 WEEKS
Qty: 1 | Refills: 11 | Status: ACTIVE | COMMUNITY

## 2023-07-10 RX ORDER — PLECANATIDE 3 MG/1
1 TABLET TABLET ORAL ONCE A DAY
Status: ACTIVE | COMMUNITY

## 2023-07-10 RX ORDER — POLYETHYLENE GLYCOL 3350, SODIUM CHLORIDE, SODIUM BICARBONATE, POTASSIUM CHLORIDE 420; 11.2; 5.72; 1.48 G/4L; G/4L; G/4L; G/4L
WHILE ON A CLEAR LIQUID DIET DRINK 8 OZ EVERY 15 MIN UNTIL GONE 2 DAYS BEFORE COLONOSCOPY AND DRINK SECOND BOTTLE AS DIRECTED ON PREP SHEET DAY BEFORE THE COLONOSCOPY POWDER, FOR SOLUTION ORAL
Qty: 2 PACK | Refills: 0 | OUTPATIENT

## 2023-07-10 RX ORDER — ISOPROPYL ALCOHOL 91 ML/100ML
TAKE 1 CAPSULE AT BEDTIME LIQUID TOPICAL
Refills: 0 | Status: ACTIVE | COMMUNITY
Start: 2011-08-01

## 2023-07-10 RX ORDER — LINACLOTIDE 145 UG/1
1 CAPSULE AT LEAST 30 MINUTES BEFORE THE FIRST MEAL OF THE DAY ON AN EMPTY STOMACH CAPSULE, GELATIN COATED ORAL ONCE A DAY
Qty: 30 | Refills: 11 | Status: ON HOLD | COMMUNITY

## 2023-07-10 NOTE — HPI-OTHER HISTORIES
Upper GI series on 4/21/2023 revealed a normal upper GI series.  There was no abnormality in the esophagus, stomach or first part of the small intestine.  There is no reflux  Abdominal ultrasound on 4/21/2023 revealed a normal liver and gallbladder.  Common bile duct was 3 mm.  Colonoscopy on 3/27/2023 revealed a fair prep, erythema and loss of vascularity in the descending colon splenic flexure transverse colon and ascending colon.  Biopsies revealed no significant abnormality.  The terminal ileum was normal.  There are multiple medium sized diverticuli of the left colon.  CT abdomen and pelvis with contrast 12/20/2022:No acute inflammation or free fluid.  No bowel obstruction.  Appendix is normal. EGD 8/16/2022:Normal esophagus, no evidence of hiatal hernia, appropriately positioned gastric fundoplication, no inflammatory changes gastric body or other lesions noted, normal first inspected portion of the duodenum.  Bravo pH probe negative for pathologic reflux.  Gastric biopsy taken for CLOtest. EGD 4/26/2022:Esophageal mucosal variant, esophageal stenosis status post dilation, medium size hiatal hernia, loose appearing fundoplication, gastritis, erosive gastropathy, normal examined duodenum.  Antral biopsy demonstrated chronic antral gastritis with reactive/chemical gastropathy type changes, intestinal metaplasia, negative for dysplasia, negative for H. pylori. Colonoscopy 3/9/2022:Poor prep, normal examined terminal ileum, tortuous colon, stool in the entire examined colon, pseudopolyps in transverse colon status post biopsy, pseudopolyps in the descending colon status post biopsy, diffuse mild mucosal changes were found in the rectosigmoid colon, descending colon, transverse colon, ascending colon secondary to colitis status post biopsy.  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

## 2023-07-10 NOTE — HPI-TODAY'S VISIT:
This is a 47-year-old female with a history of anxiety, Crohn's colitis on Humira 40 mg subcu every 2 weeks, GERD, pharyngoesophageal dysphagia, periumbilical abdominal pain, and chronic constipation presenting for follow-up. She was seen 11/21/2022 after a long interval.  She had been under the care of Dr. Alexander in [place].  Due to persistent abdominal pain, she had return for further evaluation.  It was noted that colonoscopy in 2020 demonstrated active disease.  She reported recent EGD and colonoscopy within a 2-month period of time at [facility].  She reported an upper GI series a month prior as well.  She stated there was discussion that abdominal pain may be associated with "loose mesh" from prior surgical repair.  Her surgeon in [place] had informed her there was nothing that could be done.  She was scheduled for a CT scan.  She was to continue dicyclomine and Carafate as needed.  She had daily symptoms of acid reflux on pantoprazole and famotidine.  It was discussed there may be if component of functional dyspepsia.  She was to continue Carafate.  Records were requested.  Constipation was suboptimally managed and chronic.  She reported diarrhea associated with taking Linzess 290 mcg daily.  A trial of Linzess 145 mcg daily was recommended.  She was to continue lactulose as needed and consider decreasing Linzess to 72 mcg if she had diarrhea on 145.  She called our office 12/20/2022 requesting medication for abdominal pain after her CT scan.  Dicyclomine was ineffective.  CT was reviewed by Dr. Vu as negative.  She was prescribed Librax. She called our office 1/5/2023 reporting she was taking pantoprazole and famotidine twice a day for worsening reflux.  She reported this "slowed" her heartburn but did not resolve her symptoms.  She reported that Trulance was not working for constipation and that she did not have an appetite.  Pantoprazole and famotidine were refilled for twice daily therapy.  Gastric emptying study or smart pill were considerations.  A return call was made to the patient and a voicemail was left. She reports insurance will not cover Librax.  She continues to take dicyclomine for abdominal pain.  This is ineffective.  She has constant pain in her abdomen indicated in the suprapubic area.  She reports that she sits up night to sleep and holds her side and rocks.  She continues to have constipation.  She is taking Trulance daily.  She is taking MiraLAX 1 capful twice a day.  She reports small-volume stools once or twice a week.  Stools are hard and p.o. are watery.  She has to strain to pass a bowel movement.  She has increased water intake and increased ingestion of vegetables.  Her symptoms are persistent.  She has worsening gas and bloating and reports she is "miserable."  She reports mild intermittent nausea without vomiting.  She is taking famotidine twice a day, pantoprazole 40 mg twice a day, and uses Carafate 1 g 2 or 3 times a day.  She has breakthrough heartburn at night reporting some relief with Pepcid.  She has burning into her throat.  She denies difficulty swallowing.  She denies any other abdominal symptoms. She reports abdominal pain improved after a hernia repair 2 years ago and then symptoms recurred. Records were obtained today from [facility] and are outlined below.

## 2023-08-10 ENCOUNTER — TELEPHONE ENCOUNTER (OUTPATIENT)
Dept: URBAN - METROPOLITAN AREA CLINIC 113 | Facility: CLINIC | Age: 48
End: 2023-08-10

## 2023-08-10 RX ORDER — SUCRALFATE 1 G/1
1 TABLET ON AN EMPTY STOMACH TABLET ORAL
Qty: 90 | Refills: 6

## 2023-08-10 RX ORDER — DICYCLOMINE HYDROCHLORIDE 10 MG/1
1 TABLET CAPSULE ORAL
Qty: 60 | Refills: 6
End: 2024-03-07

## 2023-08-22 ENCOUNTER — OFFICE VISIT (OUTPATIENT)
Dept: URBAN - METROPOLITAN AREA CLINIC 107 | Facility: CLINIC | Age: 48
End: 2023-08-22

## 2023-08-22 ENCOUNTER — TELEPHONE ENCOUNTER (OUTPATIENT)
Dept: URBAN - METROPOLITAN AREA CLINIC 113 | Facility: CLINIC | Age: 48
End: 2023-08-22

## 2023-08-22 NOTE — HPI-TODAY'S VISIT:
This is a 47-year-old female with a history of anxiety, Crohn's colitis on Humira 40 mg subcu every 2 weeks, GERD, pharyngoesophageal dysphagia, periumbilical abdominal pain, and constipation presenting for follow-up. She was last seen in the office 1/27/2023.  She was taking pantoprazole 40 mg twice a day, famotidine 40 mg twice a day, and Carafate 2-3 times per day.  She had persistent burning in her throat at night.  She obtained some relief with taking Pepcid.  The EGD at [facility] was notable for intestinal metaplasia of gastric mucosa and was otherwise unremarkable.  Bravo pH testing was negative for evidence of reflux.  It was discussed there was a component of functional dyspepsia.  EGD for surveillance of intestinal metaplasia planned in 3 years.  She was taking Humira 40 mg subcu every 2 weeks for Crohn's colitis.  Colonoscopy by Dr. Chiu in 2020 showed active disease.  Colonoscopy in March 2022 showed mild mucosal changes and a poor prep.  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  Repeat colonoscopy with a 2-day bowel prep was recommended.  Regarding chronic abdominal pain, endoscopic evaluation and cross-sectional imaging were negative for source.  It was discussed this was likely functional and may have a component of visceral hypersensitivity.  It was also discussed that chronic constipation was likely contributing.  She had not responded to Linzess or Trulance and MiraLAX twice a day.  Motegrity was a consideration but it is doubtful that it would be covered by Medicaid.  She was prescribed Amitiza 24 mcg twice a day.  If this was ineffective, the plan was to attempt coverage for Motegrity.  She was to continue MiraLAX with either of these medications.  A nonnarcotic pain modulator was a future consideration.  Follow-up was scheduled with Dr. Vu 2 months later and she did not return for follow-up.

## 2023-08-29 ENCOUNTER — TELEPHONE ENCOUNTER (OUTPATIENT)
Dept: URBAN - METROPOLITAN AREA CLINIC 113 | Facility: CLINIC | Age: 48
End: 2023-08-29

## 2023-08-31 ENCOUNTER — OFFICE VISIT (OUTPATIENT)
Dept: URBAN - METROPOLITAN AREA CLINIC 113 | Facility: CLINIC | Age: 48
End: 2023-08-31

## 2023-08-31 RX ORDER — ISOPROPYL ALCOHOL 91 ML/100ML
TAKE 1 CAPSULE AT BEDTIME LIQUID TOPICAL
Refills: 0 | Status: ACTIVE | COMMUNITY
Start: 2011-08-01

## 2023-08-31 RX ORDER — DICYCLOMINE HYDROCHLORIDE 10 MG/1
1 TABLET CAPSULE ORAL
Qty: 60 | Refills: 6 | Status: ACTIVE | COMMUNITY
End: 2024-03-07

## 2023-08-31 RX ORDER — SUCRALFATE 1 G/1
1 TABLET ON AN EMPTY STOMACH TABLET ORAL
Qty: 90 | Refills: 6 | Status: ACTIVE | COMMUNITY

## 2023-08-31 RX ORDER — PLECANATIDE 3 MG/1
1 TABLET TABLET ORAL ONCE A DAY
Status: ACTIVE | COMMUNITY

## 2023-08-31 RX ORDER — PANTOPRAZOLE SODIUM 40 MG/1
1 TABLET TABLET, DELAYED RELEASE ORAL TWICE A DAY
Qty: 60 TABLET | Refills: 3 | Status: ACTIVE | COMMUNITY

## 2023-08-31 RX ORDER — POLYETHYLENE GLYCOL 3350, SODIUM CHLORIDE, SODIUM BICARBONATE, POTASSIUM CHLORIDE 420; 11.2; 5.72; 1.48 G/4L; G/4L; G/4L; G/4L
WHILE ON A CLEAR LIQUID DIET DRINK 8 OZ EVERY 15 MIN UNTIL GONE 2 DAYS BEFORE COLONOSCOPY AND DRINK SECOND BOTTLE AS DIRECTED ON PREP SHEET DAY BEFORE THE COLONOSCOPY POWDER, FOR SOLUTION ORAL
Qty: 2 PACK | Refills: 0 | Status: ACTIVE | COMMUNITY

## 2023-08-31 RX ORDER — FAMOTIDINE 20 MG/1
1 TABLET TABLET, FILM COATED ORAL TWICE A DAY
Qty: 60 TABLET | Refills: 3 | Status: ACTIVE | COMMUNITY

## 2023-08-31 RX ORDER — LINACLOTIDE 145 UG/1
1 CAPSULE AT LEAST 30 MINUTES BEFORE THE FIRST MEAL OF THE DAY ON AN EMPTY STOMACH CAPSULE, GELATIN COATED ORAL ONCE A DAY
Qty: 30 | Refills: 11 | Status: ON HOLD | COMMUNITY

## 2023-08-31 RX ORDER — ADALIMUMAB 40MG/0.8ML
0.8 ML KIT SUBCUTANEOUS EVERY 2 WEEKS
Qty: 1 | Refills: 11 | Status: ACTIVE | COMMUNITY

## 2023-08-31 RX ORDER — HYDROCHLOROTHIAZIDE 25 MG/1
1 TABLET IN THE MORNING TABLET ORAL ONCE A DAY
Status: ACTIVE | COMMUNITY

## 2023-08-31 RX ORDER — VALSARTAN 80 MG/1
1 TABLET TABLET, FILM COATED ORAL ONCE A DAY
Status: ACTIVE | COMMUNITY

## 2023-08-31 RX ORDER — FAMOTIDINE 40 MG/1
1 TABLET TABLET, FILM COATED ORAL TWICE A DAY
Qty: 60 TABLET | Refills: 6 | Status: ACTIVE | COMMUNITY

## 2023-08-31 NOTE — HPI-TODAY'S VISIT:
This is a 47-year-old female with a history of anxiety, Crohn's colitis on Humira 40 mg subcu every 2 weeks, GERD, pharyngoesophageal dysphagia, periumbilical abdominal pain, and constipation presenting for follow-up. She was last seen in the office 1/27/2023.  She missed multiple follow-up visits subsequently, on 5/22/23, 7/10/2023 and 8/22/2023.   She was initially diagnosed as having Crohn's disease in 2003.  Biopsies at that time were consistent with Crohn's, with intramucosal noncaseating granulomata being seen on biopsy.  Subsequent follow-up by Dr. Chiu showed active colitis on several occasions.    She was seen 11/21/2022 after a long interval.  She had been under the care of Dr. Alexander in [place].  Due to persistent abdominal pain, she had return for further evaluation.  It was noted that colonoscopy in 2020 had demonstrated active disease.  She reported recent EGD and colonoscopy within a 2-month period of time at [facility].  She reported an upper GI series a month prior as well.  She stated there was discussion that abdominal pain may be associated with "loose mesh" from prior surgical repair.  Her surgeon in [place] had informed her there was nothing that could be done.    Colonoscopy 3/9/2022:Poor prep, normal examined terminal ileum, tortuous colon, stool in the entire examined colon, pseudopolyps in transverse colon status post biopsy, pseudopolyps in the descending colon status post biopsy, diffuse mild mucosal changes were found in the rectosigmoid colon, descending colon, transverse colon, ascending colon secondary to colitis status post biopsy.  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  EGD 4/26/2022:Esophageal mucosal variant, esophageal stenosis status post dilation, medium size hiatal hernia, loose appearing fundoplication, gastritis, erosive gastropathy, normal examined duodenum.  Antral biopsy demonstrated chronic antral gastritis with reactive/chemical gastropathy type changes, intestinal metaplasia, negative for dysplasia, negative for H. pylori.  EGD 8/16/2022:Normal esophagus, no evidence of hiatal hernia, appropriately positioned gastric fundoplication, no inflammatory changes gastric body or other lesions noted, normal first inspected portion of the duodenum.  Bravo pH probe negative for pathologic reflux.  Gastric biopsy taken for CLOtest.  She was scheduled for a CT scan after her 11/21/22 visit.  She was to continue dicyclomine and Carafate as needed.  She had daily symptoms of acid reflux on pantoprazole and famotidine.  It was discussed there may be a component of functional dyspepsia.  She was to continue Carafate.  Records were requested.    Constipation was suboptimally managed and chronic.  She reported diarrhea associated with taking Linzess 290 mcg daily.  A trial of Linzess 145 mcg daily was recommended.  She was to continue lactulose as needed and consider decreasing Linzess to 72 mcg if she had diarrhea on 145.  She called our office 12/20/2022 requesting medication for abdominal pain after her CT scan.  Dicyclomine was ineffective.  CT was negative (CT abdomen and pelvis with contrast 12/20/2022:No acute inflammation or free fluid.  No bowel obstruction.  Appendix is normal).  She was prescribed Librax.  She called our office 1/5/2023 reporting she was taking pantoprazole and famotidine twice a day for worsening reflux.  She reported this "slowed" her heartburn but did not resolve her symptoms.  She reported that Trulance was not working for constipation and that she did not have an appetite.  Pantoprazole and famotidine were refilled for twice daily therapy.  Gastric emptying study or smart pill were considerations.  A return call was made to the patient and a voicemail was left.  She reported that insurance will not cover Librax.  She continued to take dicyclomine for abdominal pain which was ineffective.  She has constant suprapubic pain and ongoing constipation despite taking Trulance  3 mg daily and MiraLAX 1 capful twice a day.  She was having small-volume hard stools once or twice a week on this regimen. She had to strain to pass a bowel movement.  She described worsening gas and bloating and reported she was "miserable."    She also described mild intermittent nausea without vomiting.  She is taking famotidine twice a day, pantoprazole 40 mg twice a day, and uses Carafate 1 g 2 or 3 times a day.  She has breakthrough heartburn at night reporting some relief with Pepcid.  She has burning into her throat.  She denies difficulty swallowing.  She denies any other abdominal symptoms.  She reports abdominal pain improved after a hernia repair 2 years ago and then symptoms recurred.  After her January 27, 2023 visit, she underwent an upper GI series on April 21, 2023 at [facility] which was normal, and abdominal sonogram on the same date, also at [place], which was normal.  Colonoscopy done on March 27, 2023 showed mild to moderately active patchy colitis throughout the colon, but biopsies were negative for any pathology or dysplasia.  Notably, the biopsies showed no active disease.

## 2023-09-01 ENCOUNTER — TELEPHONE ENCOUNTER (OUTPATIENT)
Dept: URBAN - METROPOLITAN AREA CLINIC 113 | Facility: CLINIC | Age: 48
End: 2023-09-01

## 2023-09-02 ENCOUNTER — CLAIMS CREATED FROM THE CLAIM WINDOW (OUTPATIENT)
Dept: URBAN - METROPOLITAN AREA MEDICAL CENTER 19 | Facility: MEDICAL CENTER | Age: 48
End: 2023-09-02
Payer: COMMERCIAL

## 2023-09-02 DIAGNOSIS — R19.7 DIARRHEA, UNSPECIFIED: ICD-10-CM

## 2023-09-02 DIAGNOSIS — R10.84 ABDOMINAL PAIN, GENERALIZED: ICD-10-CM

## 2023-09-02 DIAGNOSIS — D64.89 OTHER SPECIFIED ANEMIAS: ICD-10-CM

## 2023-09-02 DIAGNOSIS — K50.118 CROHN'S COLITIS, OTHER COMPLICATION: ICD-10-CM

## 2023-09-02 PROCEDURE — 99253 IP/OBS CNSLTJ NEW/EST LOW 45: CPT | Performed by: INTERNAL MEDICINE

## 2023-09-02 PROCEDURE — 99221 1ST HOSP IP/OBS SF/LOW 40: CPT | Performed by: INTERNAL MEDICINE

## 2023-09-03 ENCOUNTER — CLAIMS CREATED FROM THE CLAIM WINDOW (OUTPATIENT)
Dept: URBAN - METROPOLITAN AREA MEDICAL CENTER 19 | Facility: MEDICAL CENTER | Age: 48
End: 2023-09-03
Payer: COMMERCIAL

## 2023-09-03 DIAGNOSIS — D64.89 OTHER SPECIFIED ANEMIAS: ICD-10-CM

## 2023-09-03 DIAGNOSIS — R10.84 ABDOMINAL PAIN, GENERALIZED: ICD-10-CM

## 2023-09-03 DIAGNOSIS — K50.118 CROHN'S DISEASE OF COLON WITH OTHER COMPLICATION: ICD-10-CM

## 2023-09-03 PROCEDURE — 99232 SBSQ HOSP IP/OBS MODERATE 35: CPT | Performed by: INTERNAL MEDICINE

## 2023-09-04 ENCOUNTER — CLAIMS CREATED FROM THE CLAIM WINDOW (OUTPATIENT)
Dept: URBAN - METROPOLITAN AREA MEDICAL CENTER 19 | Facility: MEDICAL CENTER | Age: 48
End: 2023-09-04
Payer: COMMERCIAL

## 2023-09-04 DIAGNOSIS — R10.84 ABDOMINAL PAIN, GENERALIZED: ICD-10-CM

## 2023-09-04 DIAGNOSIS — K50.118: ICD-10-CM

## 2023-09-04 PROCEDURE — 99232 SBSQ HOSP IP/OBS MODERATE 35: CPT | Performed by: INTERNAL MEDICINE

## 2023-09-13 ENCOUNTER — WEB ENCOUNTER (OUTPATIENT)
Dept: URBAN - METROPOLITAN AREA CLINIC 107 | Facility: CLINIC | Age: 48
End: 2023-09-13

## 2023-09-13 ENCOUNTER — TELEPHONE ENCOUNTER (OUTPATIENT)
Dept: URBAN - METROPOLITAN AREA CLINIC 113 | Facility: CLINIC | Age: 48
End: 2023-09-13

## 2023-09-13 ENCOUNTER — OFFICE VISIT (OUTPATIENT)
Dept: URBAN - METROPOLITAN AREA CLINIC 107 | Facility: CLINIC | Age: 48
End: 2023-09-13
Payer: COMMERCIAL

## 2023-09-13 ENCOUNTER — TELEPHONE ENCOUNTER (OUTPATIENT)
Dept: URBAN - METROPOLITAN AREA CLINIC 107 | Facility: CLINIC | Age: 48
End: 2023-09-13

## 2023-09-13 VITALS
SYSTOLIC BLOOD PRESSURE: 111 MMHG | HEIGHT: 62 IN | TEMPERATURE: 96.5 F | WEIGHT: 219 LBS | BODY MASS INDEX: 40.3 KG/M2 | HEART RATE: 84 BPM | DIASTOLIC BLOOD PRESSURE: 77 MMHG | RESPIRATION RATE: 18 BRPM

## 2023-09-13 DIAGNOSIS — K50.10 CROHN'S DISEASE OF LARGE INTESTINE WITHOUT COMPLICATION: ICD-10-CM

## 2023-09-13 DIAGNOSIS — K62.5 HEMORRHAGE OF ANUS AND RECTUM: ICD-10-CM

## 2023-09-13 PROBLEM — 7620006: Status: ACTIVE | Noted: 2023-09-13

## 2023-09-13 PROCEDURE — 99215 OFFICE O/P EST HI 40 MIN: CPT | Performed by: INTERNAL MEDICINE

## 2023-09-13 RX ORDER — FAMOTIDINE 40 MG/1
1 TABLET TABLET, FILM COATED ORAL TWICE A DAY
Qty: 60 TABLET | Refills: 6 | Status: ACTIVE | COMMUNITY

## 2023-09-13 RX ORDER — PLECANATIDE 3 MG/1
1 TABLET TABLET ORAL ONCE A DAY
Status: ACTIVE | COMMUNITY

## 2023-09-13 RX ORDER — PREDNISONE 10 MG/1
4 TABLETS TABLET ORAL ONCE A DAY
Qty: 120 TABLET | Refills: 1 | OUTPATIENT
Start: 2023-09-13 | End: 2023-11-11

## 2023-09-13 RX ORDER — HYDROCHLOROTHIAZIDE 25 MG/1
1 TABLET IN THE MORNING TABLET ORAL ONCE A DAY
Status: ACTIVE | COMMUNITY

## 2023-09-13 RX ORDER — CHLORDIAZEPOXIDE HYDROCHLORIDE AND CLIDINIUM BROMIDE 5; 2.5 MG/1; MG/1
1 CAPSULE BEFORE MEALS CAPSULE ORAL TWICE A DAY
Qty: 60 | Refills: 1 | OUTPATIENT
Start: 2023-09-13 | End: 2023-11-11

## 2023-09-13 RX ORDER — PANTOPRAZOLE SODIUM 40 MG/1
1 TABLET TABLET, DELAYED RELEASE ORAL TWICE A DAY
Qty: 60 TABLET | Refills: 3 | Status: ACTIVE | COMMUNITY

## 2023-09-13 RX ORDER — DICYCLOMINE HYDROCHLORIDE 10 MG/1
1 TABLET CAPSULE ORAL
Qty: 60 | Refills: 6 | Status: ACTIVE | COMMUNITY
End: 2024-03-07

## 2023-09-13 RX ORDER — VALSARTAN 80 MG/1
1 TABLET TABLET, FILM COATED ORAL ONCE A DAY
Status: ACTIVE | COMMUNITY

## 2023-09-13 RX ORDER — ISOPROPYL ALCOHOL 91 ML/100ML
TAKE 1 CAPSULE AT BEDTIME LIQUID TOPICAL
Refills: 0 | Status: ACTIVE | COMMUNITY
Start: 2011-08-01

## 2023-09-13 RX ORDER — FAMOTIDINE 20 MG/1
1 TABLET TABLET, FILM COATED ORAL TWICE A DAY
Qty: 60 TABLET | Refills: 3 | Status: ACTIVE | COMMUNITY

## 2023-09-13 RX ORDER — LINACLOTIDE 145 UG/1
1 CAPSULE AT LEAST 30 MINUTES BEFORE THE FIRST MEAL OF THE DAY ON AN EMPTY STOMACH CAPSULE, GELATIN COATED ORAL ONCE A DAY
Qty: 30 | Refills: 11 | Status: ON HOLD | COMMUNITY

## 2023-09-13 RX ORDER — ADALIMUMAB 40MG/0.8ML
0.8 ML KIT SUBCUTANEOUS EVERY 2 WEEKS
Qty: 1 | Refills: 11 | Status: ACTIVE | COMMUNITY

## 2023-09-13 RX ORDER — POLYETHYLENE GLYCOL 3350, SODIUM CHLORIDE, SODIUM BICARBONATE, POTASSIUM CHLORIDE 420; 11.2; 5.72; 1.48 G/4L; G/4L; G/4L; G/4L
WHILE ON A CLEAR LIQUID DIET DRINK 8 OZ EVERY 15 MIN UNTIL GONE 2 DAYS BEFORE COLONOSCOPY AND DRINK SECOND BOTTLE AS DIRECTED ON PREP SHEET DAY BEFORE THE COLONOSCOPY POWDER, FOR SOLUTION ORAL
Qty: 2 PACK | Refills: 0 | Status: ON HOLD | COMMUNITY

## 2023-09-13 RX ORDER — SUCRALFATE 1 G/1
1 TABLET ON AN EMPTY STOMACH TABLET ORAL
Qty: 90 | Refills: 6 | Status: ACTIVE | COMMUNITY

## 2023-09-13 NOTE — HPI-TODAY'S VISIT:
This is a 47-year-old female with a history of anxiety, Crohn's colitis on Humira 40 mg subcu every 2 weeks, GERD, pharyngoesophageal dysphagia, periumbilical abdominal pain, and constipation presenting for follow-up. She was last seen in the office 1/27/2023.  She missed multiple follow-up visits subsequently, on 5/22/23, 7/10/2023, 8/22/2023. and 8/31/23.  She was initially diagnosed as having Crohn's disease in 2003.  Biopsies at that time were consistent with Crohn's, with intramucosal noncaseating granulomata being seen on biopsy.  Subsequent follow-up by Dr. Chiu showed active colitis on several occasions.    She was seen 11/21/2022 after a long interval.  She had been under the care of Dr. Alexander in [place].  Due to persistent abdominal pain, she had return for further evaluation.  It was noted that colonoscopy in 2020 had demonstrated active disease.  She reported recent EGD and colonoscopy within a 2-month period of time at [facility].  She reported an upper GI series a month prior as well.  She stated there was discussion that abdominal pain may be associated with "loose mesh" from prior surgical repair.  Her surgeon in [place] had informed her there was nothing that could be done.    Colonoscopy 3/9/2022:Poor prep, normal examined terminal ileum, tortuous colon, stool in the entire examined colon, pseudopolyps in transverse colon status post biopsy, pseudopolyps in the descending colon status post biopsy, diffuse mild mucosal changes were found in the rectosigmoid colon, descending colon, transverse colon, ascending colon secondary to colitis status post biopsy.  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  EGD 4/26/2022:Esophageal mucosal variant, esophageal stenosis status post dilation, medium size hiatal hernia, loose appearing fundoplication, gastritis, erosive gastropathy, normal examined duodenum.  Antral biopsy demonstrated chronic antral gastritis with reactive/chemical gastropathy type changes, intestinal metaplasia, negative for dysplasia, negative for H. pylori.  EGD 8/16/2022:Normal esophagus, no evidence of hiatal hernia, appropriately positioned gastric fundoplication, no inflammatory changes gastric body or other lesions noted, normal first inspected portion of the duodenum.  Bravo pH probe negative for pathologic reflux.  Gastric biopsy taken for CLOtest.  She was scheduled for a CT scan after her 11/21/22 visit.  She was to continue dicyclomine and Carafate as needed.  She had daily symptoms of acid reflux on pantoprazole and famotidine.  It was discussed there may be a component of functional dyspepsia.  She was to continue Carafate.  Records were requested.    Constipation was suboptimally managed and chronic.  She reported diarrhea associated with taking Linzess 290 mcg daily.  A trial of Linzess 145 mcg daily was recommended.  She was to continue lactulose as needed and consider decreasing Linzess to 72 mcg if she had diarrhea on 145.  She called our office 12/20/2022 requesting medication for abdominal pain after her CT scan.  Dicyclomine was ineffective.  CT was negative (CT abdomen and pelvis with contrast 12/20/2022:No acute inflammation or free fluid.  No bowel obstruction.  Appendix is normal).  She was prescribed Librax.  She called our office 1/5/2023 reporting she was taking pantoprazole and famotidine twice a day for worsening reflux.  She reported this "slowed" her heartburn but did not resolve her symptoms.  She reported that Trulance was not working for constipation and that she did not have an appetite.  Pantoprazole and famotidine were refilled for twice daily therapy.  Gastric emptying study or smart pill were considerations.  A return call was made to the patient and a voicemail was left.  She reported that insurance will not cover Librax.  She continued to take dicyclomine for abdominal pain which was ineffective.  She has constant suprapubic pain and ongoing constipation despite taking Trulance  3 mg daily and MiraLAX 1 capful twice a day.  She was having small-volume hard stools once or twice a week on this regimen. She had to strain to pass a bowel movement.  She described worsening gas and bloating and reported she was "miserable."    She also described mild intermittent nausea without vomiting.  She is taking famotidine twice a day, pantoprazole 40 mg twice a day, and uses Carafate 1 g 2 or 3 times a day.  She has breakthrough heartburn at night reporting some relief with Pepcid.  She has burning into her throat.  She denies difficulty swallowing.  She denies any other abdominal symptoms.  She reported abdominal pain improved after a hernia repair 2 years ago and then symptoms recurred.  After her January 27, 2023 visit, she underwent an upper GI series on April 21, 2023 at [facility] which was normal, and abdominal sonogram on the same date, also at [place], which was normal.  Colonoscopy done on March 27, 2023 showed mild to moderately active patchy colitis throughout the colon, but biopsies were negative for any pathology or dysplasia.  Notably, the biopsies showed no active disease.  The patient was hospitalized at [facility] from 9/1/2023 to 9/5/2023.  She was admitted with complaints of rectal bleeding and lower abdominal pain.  A CT scan of the abdomen pelvis showed signs of proctocolitis.  She had no leukocytosis, the stool studies were negative for any pathogens, including C. difficile.  The patient was placed on IV steroids and her stools became formed.  Stool for ova and parasites were negative, stool culture negative for Salmonella Shigella vibrio, her CRP was noted to be elevated.  GI consultation was requested, and endoscopy was not performed.  Notable labs include a white blood cell count of 6300, hemoglobin 10.4, hematocrit 31.2%, and a platelet count of 3 91,000.  Her total bilirubin was 0.4, AST 25, ALT 16, hemoglobin A1c 4.8, BUN 3, creatinine 0.68.  Blood cultures are negative.  Urine cultures were also negative.  She was discharged on 40 mg prednisone, which is supposed to be on a prednisone taper, as well as continued her Humira 40 mg every 14 days.  She was also supposed to be on Azulfidine 500 mg daily.  The patient apparently did not continue steroids after discharge.  She continues to complain of lower abdominal pain and rectal bleeding.  She denies fever, and has had no nausea and vomiting.

## 2023-09-13 NOTE — PHYSICAL EXAM GASTROINTESTINAL
Abdomen , soft,  lower quadrants tender, nondistended , no guarding or rigidity , no masses palpable , normal bowel sounds , Liver and Spleen , no hepatosplenomegaly

## 2023-09-19 ENCOUNTER — TELEPHONE ENCOUNTER (OUTPATIENT)
Dept: URBAN - METROPOLITAN AREA CLINIC 107 | Facility: CLINIC | Age: 48
End: 2023-09-19

## 2023-09-20 ENCOUNTER — TELEPHONE ENCOUNTER (OUTPATIENT)
Dept: URBAN - METROPOLITAN AREA CLINIC 107 | Facility: CLINIC | Age: 48
End: 2023-09-20

## 2023-09-20 RX ORDER — PREDNISONE 10 MG/1
4 TABLETS TABLET ORAL ONCE A DAY
Qty: 120 TABLET | Refills: 1
Start: 2023-09-13 | End: 2023-11-20

## 2023-09-26 ENCOUNTER — TELEPHONE ENCOUNTER (OUTPATIENT)
Dept: URBAN - METROPOLITAN AREA CLINIC 113 | Facility: CLINIC | Age: 48
End: 2023-09-26

## 2023-09-28 ENCOUNTER — TELEPHONE ENCOUNTER (OUTPATIENT)
Dept: URBAN - METROPOLITAN AREA CLINIC 68 | Facility: CLINIC | Age: 48
End: 2023-09-28

## 2023-10-02 ENCOUNTER — TELEPHONE ENCOUNTER (OUTPATIENT)
Dept: URBAN - METROPOLITAN AREA CLINIC 107 | Facility: CLINIC | Age: 48
End: 2023-10-02

## 2023-10-04 ENCOUNTER — TELEPHONE ENCOUNTER (OUTPATIENT)
Dept: URBAN - METROPOLITAN AREA CLINIC 113 | Facility: CLINIC | Age: 48
End: 2023-10-04

## 2023-10-23 ENCOUNTER — TELEPHONE ENCOUNTER (OUTPATIENT)
Dept: URBAN - METROPOLITAN AREA CLINIC 107 | Facility: CLINIC | Age: 48
End: 2023-10-23

## 2023-10-23 RX ORDER — PREDNISONE 10 MG/1
4 TABLETS TABLET ORAL ONCE A DAY
Qty: 120 TABLET | Refills: 1
Start: 2023-09-13 | End: 2023-12-23

## 2023-10-28 ENCOUNTER — ERX REFILL RESPONSE (OUTPATIENT)
Dept: URBAN - METROPOLITAN AREA CLINIC 113 | Facility: CLINIC | Age: 48
End: 2023-10-28

## 2023-10-28 RX ORDER — PREDNISONE 10 MG/1
TAKE 4 TABLETS BY MOUTH ONCE A DAY. TAKE WITH FOOD TABLET ORAL
Qty: 120 TABLET | Refills: 1 | OUTPATIENT

## 2023-10-28 RX ORDER — PREDNISONE 10 MG/1
TAKE 4 TABLETS BY MOUTH ONCE A DAY. TAKE WITH FOOD TABLET ORAL
Qty: 120 TABLET | Refills: 2 | OUTPATIENT

## 2023-11-13 ENCOUNTER — TELEPHONE ENCOUNTER (OUTPATIENT)
Dept: URBAN - METROPOLITAN AREA CLINIC 113 | Facility: CLINIC | Age: 48
End: 2023-11-13

## 2023-11-13 RX ORDER — RISANKIZUMAB-RZAA 60 MG/ML
AS DIRECTED INJECTION INTRAVENOUS
Qty: 3 | Refills: 0 | OUTPATIENT
Start: 2023-11-15 | End: 2024-01-10

## 2023-11-13 RX ORDER — RISANKIZUMAB-RZAA 360 MG/2.4
AS DIRECTED WEARABLE INJECTOR SUBCUTANEOUS
Qty: 1 | Refills: 5 | OUTPATIENT
Start: 2023-11-15 | End: 2024-10-16

## 2023-11-22 ENCOUNTER — TELEPHONE ENCOUNTER (OUTPATIENT)
Dept: URBAN - METROPOLITAN AREA CLINIC 107 | Facility: CLINIC | Age: 48
End: 2023-11-22

## 2023-11-30 ENCOUNTER — LAB OUTSIDE AN ENCOUNTER (OUTPATIENT)
Dept: URBAN - METROPOLITAN AREA CLINIC 113 | Facility: CLINIC | Age: 48
End: 2023-11-30

## 2023-11-30 ENCOUNTER — OFFICE VISIT (OUTPATIENT)
Dept: URBAN - METROPOLITAN AREA CLINIC 113 | Facility: CLINIC | Age: 48
End: 2023-11-30
Payer: COMMERCIAL

## 2023-11-30 VITALS
BODY MASS INDEX: 41.59 KG/M2 | TEMPERATURE: 98.6 F | HEIGHT: 62 IN | SYSTOLIC BLOOD PRESSURE: 110 MMHG | HEART RATE: 90 BPM | DIASTOLIC BLOOD PRESSURE: 96 MMHG | WEIGHT: 226 LBS | RESPIRATION RATE: 16 BRPM

## 2023-11-30 DIAGNOSIS — K50.10 CROHN'S DISEASE OF COLON WITHOUT COMPLICATION: ICD-10-CM

## 2023-11-30 PROCEDURE — 99215 OFFICE O/P EST HI 40 MIN: CPT | Performed by: INTERNAL MEDICINE

## 2023-11-30 RX ORDER — CHLORDIAZEPOXIDE HYDROCHLORIDE AND CLIDINIUM BROMIDE 5; 2.5 MG/1; MG/1
1 CAPSULE BEFORE MEALS CAPSULE ORAL TWICE A DAY
Qty: 60 | OUTPATIENT
Start: 2023-11-30 | End: 2023-12-30

## 2023-11-30 RX ORDER — FAMOTIDINE 20 MG/1
1 TABLET TABLET, FILM COATED ORAL TWICE A DAY
Qty: 60 TABLET | Refills: 3 | Status: ON HOLD | COMMUNITY

## 2023-11-30 RX ORDER — CIPROFLOXACIN HYDROCHLORIDE 250 MG/1
1 TABLET TABLET, FILM COATED ORAL
Qty: 6 | OUTPATIENT
Start: 2023-11-30 | End: 2023-12-03

## 2023-11-30 RX ORDER — SUCRALFATE 1 G/1
1 TABLET ON AN EMPTY STOMACH TABLET ORAL
Qty: 90 | Refills: 6 | Status: ACTIVE | COMMUNITY

## 2023-11-30 RX ORDER — PREDNISONE 10 MG/1
TAKE 4 TABLETS BY MOUTH ONCE A DAY. TAKE WITH FOOD TABLET ORAL
Qty: 120 TABLET | Refills: 1 | Status: ACTIVE | COMMUNITY

## 2023-11-30 RX ORDER — PLECANATIDE 3 MG/1
1 TABLET TABLET ORAL ONCE A DAY
Status: ACTIVE | COMMUNITY

## 2023-11-30 RX ORDER — PANTOPRAZOLE SODIUM 40 MG/1
1 TABLET TABLET, DELAYED RELEASE ORAL TWICE A DAY
Qty: 60 TABLET | Refills: 3 | Status: ACTIVE | COMMUNITY

## 2023-11-30 RX ORDER — METRONIDAZOLE 375 MG/1
2 CAPSULES CAPSULE ORAL
Qty: 30 | OUTPATIENT
Start: 2023-11-30 | End: 2023-12-05

## 2023-11-30 RX ORDER — HYDROCHLOROTHIAZIDE 25 MG/1
1 TABLET IN THE MORNING TABLET ORAL ONCE A DAY
Status: ACTIVE | COMMUNITY

## 2023-11-30 RX ORDER — RISANKIZUMAB-RZAA 60 MG/ML
AS DIRECTED INJECTION INTRAVENOUS
Qty: 3 | Refills: 0 | Status: ON HOLD | COMMUNITY
Start: 2023-11-15 | End: 2024-01-10

## 2023-11-30 RX ORDER — POLYETHYLENE GLYCOL 3350, SODIUM CHLORIDE, SODIUM BICARBONATE, POTASSIUM CHLORIDE 420; 11.2; 5.72; 1.48 G/4L; G/4L; G/4L; G/4L
WHILE ON A CLEAR LIQUID DIET DRINK 8 OZ EVERY 15 MIN UNTIL GONE 2 DAYS BEFORE COLONOSCOPY AND DRINK SECOND BOTTLE AS DIRECTED ON PREP SHEET DAY BEFORE THE COLONOSCOPY POWDER, FOR SOLUTION ORAL
Qty: 2 PACK | Refills: 0 | Status: ON HOLD | COMMUNITY

## 2023-11-30 RX ORDER — VALSARTAN 80 MG/1
1 TABLET TABLET, FILM COATED ORAL ONCE A DAY
Status: ACTIVE | COMMUNITY

## 2023-11-30 RX ORDER — DICYCLOMINE HYDROCHLORIDE 10 MG/1
1 TABLET CAPSULE ORAL
Qty: 60 | Refills: 6 | Status: ACTIVE | COMMUNITY
End: 2024-03-07

## 2023-11-30 RX ORDER — ADALIMUMAB 40MG/0.8ML
0.8 ML KIT SUBCUTANEOUS EVERY 2 WEEKS
Qty: 1 | Refills: 11 | Status: ON HOLD | COMMUNITY

## 2023-11-30 RX ORDER — FAMOTIDINE 40 MG/1
1 TABLET TABLET, FILM COATED ORAL TWICE A DAY
Qty: 60 TABLET | Refills: 6 | Status: ACTIVE | COMMUNITY

## 2023-11-30 RX ORDER — ISOPROPYL ALCOHOL 91 ML/100ML
TAKE 1 CAPSULE AT BEDTIME LIQUID TOPICAL
Refills: 0 | Status: ACTIVE | COMMUNITY
Start: 2011-08-01

## 2023-11-30 RX ORDER — LINACLOTIDE 145 UG/1
1 CAPSULE AT LEAST 30 MINUTES BEFORE THE FIRST MEAL OF THE DAY ON AN EMPTY STOMACH CAPSULE, GELATIN COATED ORAL ONCE A DAY
Qty: 30 | Refills: 11 | Status: ON HOLD | COMMUNITY

## 2023-11-30 RX ORDER — RISANKIZUMAB-RZAA 360 MG/2.4
AS DIRECTED WEARABLE INJECTOR SUBCUTANEOUS
Qty: 1 | Refills: 5 | Status: ON HOLD | COMMUNITY
Start: 2023-11-15 | End: 2024-10-16

## 2023-11-30 NOTE — HPI-TODAY'S VISIT:
This is a 47-year-old female with a history of anxiety, Crohn's colitis on Humira 40 mg subcu every 2 weeks, GERD, pharyngoesophageal dysphagia, periumbilical abdominal pain, and constipation presenting for follow-up. She was last seen in the office  9/13/23.  She had previously missed multiple follow-up visits on 5/22/23, 7/10/2023, 8/22/2023. and 8/31/23.  She was initially diagnosed as having Crohn's disease in 2003.  Biopsies at that time were consistent with Crohn's, with intramucosal noncaseating granulomata being seen on biopsy.  Subsequent follow-up by Dr. Chiu showed active colitis on several occasions.    She was seen 11/21/2022 after a long interval.  She had been under the care of Dr. Alexander in [place].  Due to persistent abdominal pain, she had return for further evaluation.  It was noted that colonoscopy in 2020 had demonstrated active disease.  She reported recent EGD and colonoscopy within a 2-month period of time at [facility].  She reported an upper GI series a month prior as well.  She stated there was discussion that abdominal pain may be associated with "loose mesh" from prior surgical repair.  Her surgeon in [place] had informed her there was nothing that could be done.    Colonoscopy 3/9/2022:Poor prep, normal examined terminal ileum, tortuous colon, stool in the entire examined colon, pseudopolyps in transverse colon status post biopsy, pseudopolyps in the descending colon status post biopsy, diffuse mild mucosal changes were found in the rectosigmoid colon, descending colon, transverse colon, ascending colon secondary to colitis status post biopsy.  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  EGD 4/26/2022:Esophageal mucosal variant, esophageal stenosis status post dilation, medium size hiatal hernia, loose appearing fundoplication, gastritis, erosive gastropathy, normal examined duodenum.  Antral biopsy demonstrated chronic antral gastritis with reactive/chemical gastropathy type changes, intestinal metaplasia, negative for dysplasia, negative for H. pylori.  EGD 8/16/2022:Normal esophagus, no evidence of hiatal hernia, appropriately positioned gastric fundoplication, no inflammatory changes gastric body or other lesions noted, normal first inspected portion of the duodenum.  Bravo pH probe negative for pathologic reflux.  Gastric biopsy taken for CLOtest.  She was scheduled for a CT scan after her 11/21/22 visit.  She was to continue dicyclomine and Carafate as needed.  She had daily symptoms of acid reflux on pantoprazole and famotidine.  It was discussed there may be a component of functional dyspepsia.  She was to continue Carafate.  Records were requested.    Constipation was suboptimally managed and chronic.  She reported diarrhea associated with taking Linzess 290 mcg daily.  A trial of Linzess 145 mcg daily was recommended.  She was to continue lactulose as needed and consider decreasing Linzess to 72 mcg if she had diarrhea on 145.  She called our office 12/20/2022 requesting medication for abdominal pain after her CT scan.  Dicyclomine was ineffective.  CT was negative (CT abdomen and pelvis with contrast 12/20/2022:No acute inflammation or free fluid.  No bowel obstruction.  Appendix is normal).  She was prescribed Librax.  She called our office 1/5/2023 reporting she was taking pantoprazole and famotidine twice a day for worsening reflux.  She reported this "slowed" her heartburn but did not resolve her symptoms.  She reported that Trulance was not working for constipation and that she did not have an appetite.  Pantoprazole and famotidine were refilled for twice daily therapy.  Gastric emptying study or smart pill were considerations.  A return call was made to the patient and a voicemail was left.  She reported that insurance will not cover Librax.  She continued to take dicyclomine for abdominal pain which was ineffective.  She has constant suprapubic pain and ongoing constipation despite taking Trulance  3 mg daily and MiraLAX 1 capful twice a day.  She was having small-volume hard stools once or twice a week on this regimen. She had to strain to pass a bowel movement.  She described worsening gas and bloating and reported she was "miserable."    She also described mild intermittent nausea without vomiting.  She is taking famotidine twice a day, pantoprazole 40 mg twice a day, and uses Carafate 1 g 2 or 3 times a day.  She has breakthrough heartburn at night reporting some relief with Pepcid.  She has burning into her throat.  She denies difficulty swallowing.  She denies any other abdominal symptoms.  She reported abdominal pain improved after a hernia repair 2 years ago and then symptoms recurred.  After her January 27, 2023 visit, she underwent an upper GI series on April 21, 2023 at [facility] which was normal, and abdominal sonogram on the same date, also at [place], which was normal.  Colonoscopy done on March 27, 2023 showed mild to moderately active patchy colitis throughout the colon, but biopsies were negative for any pathology or dysplasia.  Notably, the biopsies showed no active disease.  The patient was hospitalized at [facility] from 9/1/2023 to 9/5/2023.  She was admitted with complaints of rectal bleeding and lower abdominal pain.  A CT scan of the abdomen pelvis showed signs of proctocolitis.  She had no leukocytosis, the stool studies were negative for any pathogens, including C. difficile.  The patient was placed on IV steroids and her stools became formed.  Stool for ova and parasites were negative, stool culture negative for Salmonella Shigella vibrio, her CRP was noted to be elevated.  GI consultation was requested, and endoscopy was not performed.  Notable labs include a white blood cell count of 6300, hemoglobin 10.4, hematocrit 31.2%, and a platelet count of 3 91,000.  Her total bilirubin was 0.4, AST 25, ALT 16, hemoglobin A1c 4.8, BUN 3, creatinine 0.68.  Blood cultures are negative.  Urine cultures were also negative.  She was discharged on 40 mg prednisone, which is supposed to be on a prednisone taper, as well as continued her Humira 40 mg every 14 days.  She was also supposed to be on Azulfidine 500 mg daily.  The patient apparently did not continue steroids after discharge.  She continues to complain of lower abdominal pain and rectal bleeding.  She denies fever, and has had no nausea and vomiting.  The patient was placed on prednisone 40 mg daily as well as as needed Librax after her last visit.  She had labs drawn including a QuantiFERON gold TB test, hepatitis B serologies, etc., all of which are negative, and her C-reactive protein was less than 0.4.  White blood cell count was 9.25, hemoglobin 10.9, hematocrit 33.8%, and platelet count 522,000.  Her sedimentation rate was 10.After that visit, we attempted to get approval for multiple biologic agents, none of which were approved.  She has continued on prednisone but is having breakthrough symptoms.  She returns today with ongoing complaints of rectal bleeding, abdominal pain, etc.  The patient complains of a throbbing type pain in her lower abdomen, as well as frequent defecation, with some blood in her stool intermittent intervals.  She is having 1-2 bowel movements per day on average.  Again, she has been unable to obtain approval for biologic agents, so she remains on prednisone 40 mg daily.

## 2023-12-01 LAB
A/G RATIO: 1.5
ABSOLUTE BASOPHILS: 20
ABSOLUTE EOSINOPHILS: 0
ABSOLUTE LYMPHOCYTES: 810
ABSOLUTE MONOCYTES: 250
ABSOLUTE NEUTROPHILS: 8920
ALBUMIN: 4.1
ALKALINE PHOSPHATASE: 53
ALT (SGPT): 21
AST (SGOT): 18
BASOPHILS: 0.2
BILIRUBIN, TOTAL: 0.3
BUN/CREATININE RATIO: (no result)
BUN: 10
C-REACTIVE PROTEIN, QUANT: 9.6
CALCIUM: 9.8
CARBON DIOXIDE, TOTAL: 28
CHLORIDE: 94
CREATININE: 0.83
EGFR: 89
EOSINOPHILS: 0
GLOBULIN, TOTAL: 2.8
GLUCOSE: 107
HEMATOCRIT: 36.5
HEMOGLOBIN: 12.1
LYMPHOCYTES: 8.1
MCH: 29.3
MCHC: 33.2
MCV: 88.4
MONOCYTES: 2.5
MPV: 9.3
NEUTROPHILS: 89.2
PLATELET COUNT: 387
POTASSIUM: 3.1
PROTEIN, TOTAL: 6.9
RDW: 13
RED BLOOD CELL COUNT: 4.13
SED RATE BY MODIFIED: 29
SODIUM: 137
WHITE BLOOD CELL COUNT: 10

## 2023-12-12 ENCOUNTER — CLAIMS CREATED FROM THE CLAIM WINDOW (OUTPATIENT)
Dept: URBAN - METROPOLITAN AREA CLINIC 107 | Facility: CLINIC | Age: 48
End: 2023-12-12
Payer: COMMERCIAL

## 2023-12-12 ENCOUNTER — DASHBOARD ENCOUNTERS (OUTPATIENT)
Age: 48
End: 2023-12-12

## 2023-12-12 ENCOUNTER — TELEPHONE ENCOUNTER (OUTPATIENT)
Dept: URBAN - METROPOLITAN AREA CLINIC 113 | Facility: CLINIC | Age: 48
End: 2023-12-12

## 2023-12-12 VITALS
DIASTOLIC BLOOD PRESSURE: 87 MMHG | HEIGHT: 62 IN | TEMPERATURE: 97.8 F | BODY MASS INDEX: 40.85 KG/M2 | HEART RATE: 106 BPM | WEIGHT: 222 LBS | SYSTOLIC BLOOD PRESSURE: 106 MMHG

## 2023-12-12 DIAGNOSIS — K21.9 GERD WITHOUT ESOPHAGITIS: ICD-10-CM

## 2023-12-12 DIAGNOSIS — K50.10 CROHN'S DISEASE OF LARGE INTESTINE WITHOUT COMPLICATION: ICD-10-CM

## 2023-12-12 DIAGNOSIS — K59.09 CHRONIC CONSTIPATION: ICD-10-CM

## 2023-12-12 DIAGNOSIS — R13.14 PHARYNGOESOPHAGEAL DYSPHAGIA: ICD-10-CM

## 2023-12-12 DIAGNOSIS — R10.33 PERIUMBILICAL ABDOMINAL PAIN: ICD-10-CM

## 2023-12-12 PROBLEM — 266435005: Status: ACTIVE | Noted: 2023-12-12

## 2023-12-12 PROCEDURE — 99214 OFFICE O/P EST MOD 30 MIN: CPT | Performed by: NURSE PRACTITIONER

## 2023-12-12 RX ORDER — PANTOPRAZOLE SODIUM 40 MG/1
1 TABLET TABLET, DELAYED RELEASE ORAL TWICE DAILY
Qty: 60 | Refills: 11 | OUTPATIENT
Start: 2023-12-12

## 2023-12-12 RX ORDER — POLYETHYLENE GLYCOL 3350, SODIUM CHLORIDE, SODIUM BICARBONATE, POTASSIUM CHLORIDE 420; 11.2; 5.72; 1.48 G/4L; G/4L; G/4L; G/4L
WHILE ON A CLEAR LIQUID DIET DRINK 8 OZ EVERY 15 MIN UNTIL GONE 2 DAYS BEFORE COLONOSCOPY AND DRINK SECOND BOTTLE AS DIRECTED ON PREP SHEET DAY BEFORE THE COLONOSCOPY POWDER, FOR SOLUTION ORAL
Qty: 2 PACK | Refills: 0 | Status: ON HOLD | COMMUNITY

## 2023-12-12 RX ORDER — PANTOPRAZOLE SODIUM 40 MG/1
1 TABLET TABLET, DELAYED RELEASE ORAL ONCE A DAY
Qty: 30 TABLET | Refills: 3 | Status: ACTIVE | COMMUNITY

## 2023-12-12 RX ORDER — ISOPROPYL ALCOHOL 91 ML/100ML
TAKE 1 CAPSULE AT BEDTIME LIQUID TOPICAL
Refills: 0 | Status: ACTIVE | COMMUNITY
Start: 2011-08-01

## 2023-12-12 RX ORDER — ADALIMUMAB 40MG/0.8ML
0.8 ML KIT SUBCUTANEOUS EVERY 2 WEEKS
Qty: 1 | Refills: 11 | Status: ON HOLD | COMMUNITY

## 2023-12-12 RX ORDER — FAMOTIDINE 40 MG/1
1 TABLET AT BEDTIME TABLET, FILM COATED ORAL ONCE A DAY
Qty: 30 TABLET | Refills: 6 | Status: ACTIVE | COMMUNITY

## 2023-12-12 RX ORDER — DICYCLOMINE HYDROCHLORIDE 10 MG/1
1 TABLET CAPSULE ORAL
Qty: 60 | Refills: 6 | Status: ACTIVE | COMMUNITY
End: 2024-03-07

## 2023-12-12 RX ORDER — PREDNISONE 10 MG/1
TAKE 4 TABLETS BY MOUTH ONCE A DAY. TAKE WITH FOOD TABLET ORAL
Qty: 120 TABLET | Refills: 1 | Status: ACTIVE | COMMUNITY

## 2023-12-12 RX ORDER — SUCRALFATE 1 G/1
1 TABLET ON AN EMPTY STOMACH TABLET ORAL
Qty: 90 | Refills: 6 | Status: ON HOLD | COMMUNITY

## 2023-12-12 RX ORDER — FAMOTIDINE 20 MG/1
1 TABLET TABLET, FILM COATED ORAL TWICE A DAY
Qty: 60 TABLET | Refills: 3 | Status: ON HOLD | COMMUNITY

## 2023-12-12 RX ORDER — HYDROCHLOROTHIAZIDE 25 MG/1
1 TABLET IN THE MORNING TABLET ORAL ONCE A DAY
Status: ACTIVE | COMMUNITY

## 2023-12-12 RX ORDER — PREDNISONE 10 MG/1
4 TABS TABLET ORAL ONCE A DAY
Status: ACTIVE | COMMUNITY

## 2023-12-12 RX ORDER — CIPROFLOXACIN HYDROCHLORIDE 250 MG/1
1 TABLET TABLET, FILM COATED ORAL
Qty: 14 TABLET | Refills: 1 | OUTPATIENT
Start: 2023-12-12 | End: 2023-12-26

## 2023-12-12 RX ORDER — DICYCLOMINE HYDROCHLORIDE 10 MG/1
1 TABLET CAPSULE ORAL
Qty: 120 | Refills: 3 | OUTPATIENT
Start: 2023-12-12 | End: 2024-04-10

## 2023-12-12 RX ORDER — METRONIDAZOLE 500 MG/1
1 TABLET TABLET ORAL THREE TIMES A DAY
Qty: 42 TABLET | Refills: 0 | OUTPATIENT
Start: 2023-12-12 | End: 2023-12-26

## 2023-12-12 RX ORDER — LINACLOTIDE 290 UG/1
1 CAPSULE AT LEAST 30 MINUTES BEFORE THE FIRST MEAL OF THE DAY ON AN EMPTY STOMACH CAPSULE, GELATIN COATED ORAL ONCE A DAY
Qty: 30 | Refills: 11 | OUTPATIENT
Start: 2023-12-12 | End: 2024-12-06

## 2023-12-12 RX ORDER — LINACLOTIDE 145 UG/1
1 CAPSULE AT LEAST 30 MINUTES BEFORE THE FIRST MEAL OF THE DAY ON AN EMPTY STOMACH CAPSULE, GELATIN COATED ORAL ONCE A DAY
Qty: 30 | Refills: 11 | Status: ON HOLD | COMMUNITY

## 2023-12-12 RX ORDER — RISANKIZUMAB-RZAA 60 MG/ML
AS DIRECTED INJECTION INTRAVENOUS
Qty: 3 | Refills: 0 | Status: ON HOLD | COMMUNITY
Start: 2023-11-15 | End: 2024-01-10

## 2023-12-12 RX ORDER — RISANKIZUMAB-RZAA 360 MG/2.4
AS DIRECTED WEARABLE INJECTOR SUBCUTANEOUS
Qty: 1 | Refills: 5 | Status: ON HOLD | COMMUNITY
Start: 2023-11-15 | End: 2024-10-16

## 2023-12-12 RX ORDER — CHLORDIAZEPOXIDE HYDROCHLORIDE AND CLIDINIUM BROMIDE 5; 2.5 MG/1; MG/1
1 CAPSULE BEFORE MEALS CAPSULE ORAL TWICE A DAY
Qty: 60 | Status: ON HOLD | COMMUNITY
Start: 2023-11-30 | End: 2023-12-30

## 2023-12-12 RX ORDER — VALSARTAN 80 MG/1
1 TABLET TABLET, FILM COATED ORAL ONCE A DAY
Status: ACTIVE | COMMUNITY

## 2023-12-12 NOTE — HPI-TODAY'S VISIT:
This is a 47-year-old female with a history of anxiety, Crohn's colitis on Humira 40 mg subcu every 2 weeks, GERD, pharyngoesophageal dysphagia, periumbilical abdominal pain, and constipation presenting for follow-up. She was last seen in the office 11/30/2023.  She was suboptimally controlled on prednisone 40 mg daily.  It was discussed this was not a maintenance medication.  Dr. Vu was at the opinion that she needed maintenance therapy with a biologic.  In the interim, she was to begin Cipro and Flagyl and use Librax for crampy abdominal pain.  Labs and a CT were ordered. Labs 11/30/2023:ESR 29.  CRP 9.6.  BMP normal with exception of glucose 107, potassium 3.1, chloride 94.  LFTs normal TB 0.3, ALP 53, ALT 21, AST 18.  CBC: WBC 10, hemoglobin 12.1, MCV 88.4, platelet 387. She is taking pantoprazole in the morning and famotidine at bedtime.  She is having frequent daily heartburn and reports excessive belching during the day or at night.  She has chronic difficulty swallowing associated with eating meat reporting that it lodges in her throat and she has to "gag it up."  She has occasional mild nausea without vomiting.  She is taking Linzess 145 mcg for constipation.  Occasionally, she is taking 3 capsules.  She is taking Linzess daily and is having 3-4 bowel movements per week.  Her stools began as hard and progressed to watery.  She has occasional small-volume blood on the tissue.  She is taking dicyclomine for abdominal cramps and is requiring it frequently. Her insurance would not cover Stelara.  She has an email regarding patient assistance for [place].  She has a form in the email that needs to be completed by herself and a portion that needs to be completed by her medical provider. She continues to take prednisone 40 mg daily.  She did not  prescriptions for Librax or Cipro or metronidazole.

## 2023-12-22 ENCOUNTER — ERX REFILL RESPONSE (OUTPATIENT)
Dept: URBAN - METROPOLITAN AREA CLINIC 113 | Facility: CLINIC | Age: 48
End: 2023-12-22

## 2023-12-22 RX ORDER — LINACLOTIDE 145 UG/1
TAKE 1 CAPSULE BY MOUTH EVERY DAY AT LEAST 30 MINUTES BEFORE THE FIRST MEAL OF THE DAY ON AN EMPTY STOMACH CAPSULE, GELATIN COATED ORAL
Qty: 30 CAPSULE | Refills: 3 | OUTPATIENT

## 2023-12-22 RX ORDER — LINACLOTIDE 145 UG/1
1 CAPSULE AT LEAST 30 MINUTES BEFORE THE FIRST MEAL OF THE DAY ON AN EMPTY STOMACH CAPSULE, GELATIN COATED ORAL ONCE A DAY
Qty: 30 | Refills: 11 | OUTPATIENT

## 2023-12-27 ENCOUNTER — TELEPHONE ENCOUNTER (OUTPATIENT)
Dept: URBAN - METROPOLITAN AREA CLINIC 113 | Facility: CLINIC | Age: 48
End: 2023-12-27

## 2023-12-28 ENCOUNTER — TELEPHONE ENCOUNTER (OUTPATIENT)
Dept: URBAN - METROPOLITAN AREA CLINIC 113 | Facility: CLINIC | Age: 48
End: 2023-12-28

## 2024-01-25 ENCOUNTER — TELEPHONE ENCOUNTER (OUTPATIENT)
Dept: URBAN - METROPOLITAN AREA CLINIC 107 | Facility: CLINIC | Age: 49
End: 2024-01-25

## 2024-01-25 RX ORDER — PREDNISONE 10 MG/1
4 TABLETS TABLET ORAL ONCE A DAY
Qty: 120 TABLET | Refills: 1 | OUTPATIENT
Start: 2024-01-26 | End: 2024-03-26

## 2024-01-26 ENCOUNTER — TELEPHONE ENCOUNTER (OUTPATIENT)
Dept: URBAN - METROPOLITAN AREA CLINIC 113 | Facility: CLINIC | Age: 49
End: 2024-01-26

## 2024-01-26 RX ORDER — VEDOLIZUMAB 300 MG/5ML
AS DIRECTED INJECTION, POWDER, LYOPHILIZED, FOR SOLUTION INTRAVENOUS
Qty: 300 | Refills: 7 | OUTPATIENT
Start: 2024-01-26 | End: 2024-09-22

## 2024-03-04 ENCOUNTER — ENT (OUTPATIENT)
Dept: URBAN - METROPOLITAN AREA CLINIC 112 | Facility: CLINIC | Age: 49
End: 2024-03-04
Payer: COMMERCIAL

## 2024-03-04 VITALS
BODY MASS INDEX: 36.62 KG/M2 | HEART RATE: 101 BPM | RESPIRATION RATE: 18 BRPM | WEIGHT: 199 LBS | SYSTOLIC BLOOD PRESSURE: 100 MMHG | HEIGHT: 62 IN | TEMPERATURE: 97.2 F | DIASTOLIC BLOOD PRESSURE: 79 MMHG

## 2024-03-04 DIAGNOSIS — K50.10 CROHN'S DISEASE OF LARGE INTESTINE WITHOUT COMPLICATION: ICD-10-CM

## 2024-03-04 PROCEDURE — 96413 CHEMO IV INFUSION 1 HR: CPT | Performed by: INTERNAL MEDICINE

## 2024-03-04 RX ORDER — VALSARTAN 80 MG/1
1 TABLET TABLET, FILM COATED ORAL ONCE A DAY
Status: ACTIVE | COMMUNITY

## 2024-03-04 RX ORDER — PREDNISONE 10 MG/1
4 TABS TABLET ORAL ONCE A DAY
Status: ACTIVE | COMMUNITY

## 2024-03-04 RX ORDER — PANTOPRAZOLE SODIUM 40 MG/1
1 TABLET TABLET, DELAYED RELEASE ORAL ONCE A DAY
Qty: 30 TABLET | Refills: 3 | Status: ACTIVE | COMMUNITY

## 2024-03-04 RX ORDER — FAMOTIDINE 20 MG/1
1 TABLET TABLET, FILM COATED ORAL TWICE A DAY
Qty: 60 TABLET | Refills: 3 | Status: ON HOLD | COMMUNITY

## 2024-03-04 RX ORDER — PREDNISONE 10 MG/1
TAKE 4 TABLETS BY MOUTH ONCE A DAY. TAKE WITH FOOD TABLET ORAL
Qty: 120 TABLET | Refills: 1 | Status: ACTIVE | COMMUNITY

## 2024-03-04 RX ORDER — LINACLOTIDE 145 UG/1
TAKE 1 CAPSULE BY MOUTH EVERY DAY AT LEAST 30 MINUTES BEFORE THE FIRST MEAL OF THE DAY ON AN EMPTY STOMACH CAPSULE, GELATIN COATED ORAL
Qty: 30 CAPSULE | Refills: 3 | Status: ACTIVE | COMMUNITY

## 2024-03-04 RX ORDER — DICYCLOMINE HYDROCHLORIDE 10 MG/1
1 TABLET CAPSULE ORAL
Qty: 60 | Refills: 6 | Status: ACTIVE | COMMUNITY
End: 2024-03-07

## 2024-03-04 RX ORDER — HYDROCHLOROTHIAZIDE 25 MG/1
1 TABLET IN THE MORNING TABLET ORAL ONCE A DAY
Status: ACTIVE | COMMUNITY

## 2024-03-04 RX ORDER — VEDOLIZUMAB 300 MG/5ML
AS DIRECTED INJECTION, POWDER, LYOPHILIZED, FOR SOLUTION INTRAVENOUS
Qty: 300 | Refills: 7 | Status: ACTIVE | COMMUNITY
Start: 2024-01-26 | End: 2024-09-22

## 2024-03-04 RX ORDER — SUCRALFATE 1 G/1
1 TABLET ON AN EMPTY STOMACH TABLET ORAL
Qty: 90 | Refills: 6 | Status: ON HOLD | COMMUNITY

## 2024-03-04 RX ORDER — ADALIMUMAB 40MG/0.8ML
0.8 ML KIT SUBCUTANEOUS EVERY 2 WEEKS
Qty: 1 | Refills: 11 | Status: ON HOLD | COMMUNITY

## 2024-03-04 RX ORDER — POLYETHYLENE GLYCOL 3350, SODIUM CHLORIDE, SODIUM BICARBONATE, POTASSIUM CHLORIDE 420; 11.2; 5.72; 1.48 G/4L; G/4L; G/4L; G/4L
WHILE ON A CLEAR LIQUID DIET DRINK 8 OZ EVERY 15 MIN UNTIL GONE 2 DAYS BEFORE COLONOSCOPY AND DRINK SECOND BOTTLE AS DIRECTED ON PREP SHEET DAY BEFORE THE COLONOSCOPY POWDER, FOR SOLUTION ORAL
Qty: 2 PACK | Refills: 0 | Status: ON HOLD | COMMUNITY

## 2024-03-04 RX ORDER — DICYCLOMINE HYDROCHLORIDE 10 MG/1
1 TABLET CAPSULE ORAL
Qty: 120 | Refills: 3 | Status: ACTIVE | COMMUNITY
Start: 2023-12-12 | End: 2024-04-10

## 2024-03-04 RX ORDER — PREDNISONE 10 MG/1
4 TABLETS TABLET ORAL ONCE A DAY
Qty: 120 TABLET | Refills: 1 | Status: ACTIVE | COMMUNITY
Start: 2024-01-26 | End: 2024-03-26

## 2024-03-04 RX ORDER — LINACLOTIDE 290 UG/1
1 CAPSULE AT LEAST 30 MINUTES BEFORE THE FIRST MEAL OF THE DAY ON AN EMPTY STOMACH CAPSULE, GELATIN COATED ORAL ONCE A DAY
Qty: 30 | Refills: 11 | Status: ACTIVE | COMMUNITY
Start: 2023-12-12 | End: 2024-12-06

## 2024-03-04 RX ORDER — PANTOPRAZOLE SODIUM 40 MG/1
1 TABLET TABLET, DELAYED RELEASE ORAL TWICE DAILY
Qty: 60 | Refills: 11 | Status: ACTIVE | COMMUNITY
Start: 2023-12-12

## 2024-03-04 RX ORDER — FAMOTIDINE 40 MG/1
1 TABLET AT BEDTIME TABLET, FILM COATED ORAL ONCE A DAY
Qty: 30 TABLET | Refills: 6 | Status: ACTIVE | COMMUNITY

## 2024-03-04 RX ORDER — ISOPROPYL ALCOHOL 91 ML/100ML
TAKE 1 CAPSULE AT BEDTIME LIQUID TOPICAL
Refills: 0 | Status: ACTIVE | COMMUNITY
Start: 2011-08-01

## 2024-03-04 RX ORDER — RISANKIZUMAB-RZAA 360 MG/2.4
AS DIRECTED WEARABLE INJECTOR SUBCUTANEOUS
Qty: 1 | Refills: 5 | Status: ON HOLD | COMMUNITY
Start: 2023-11-15 | End: 2024-10-16

## 2024-03-18 ENCOUNTER — ENT (OUTPATIENT)
Dept: URBAN - METROPOLITAN AREA CLINIC 112 | Facility: CLINIC | Age: 49
End: 2024-03-18

## 2024-03-19 ENCOUNTER — ENT (OUTPATIENT)
Dept: URBAN - METROPOLITAN AREA CLINIC 112 | Facility: CLINIC | Age: 49
End: 2024-03-19
Payer: COMMERCIAL

## 2024-03-19 VITALS
HEART RATE: 87 BPM | HEIGHT: 62 IN | DIASTOLIC BLOOD PRESSURE: 83 MMHG | BODY MASS INDEX: 36.62 KG/M2 | SYSTOLIC BLOOD PRESSURE: 114 MMHG | WEIGHT: 199 LBS | RESPIRATION RATE: 20 BRPM | TEMPERATURE: 97 F

## 2024-03-19 DIAGNOSIS — K50.10 CROHN'S DISEASE OF COLON WITHOUT COMPLICATION: ICD-10-CM

## 2024-03-19 PROCEDURE — 96413 CHEMO IV INFUSION 1 HR: CPT | Performed by: INTERNAL MEDICINE

## 2024-03-19 RX ORDER — PANTOPRAZOLE SODIUM 40 MG/1
1 TABLET TABLET, DELAYED RELEASE ORAL ONCE A DAY
Qty: 30 TABLET | Refills: 3 | Status: ACTIVE | COMMUNITY

## 2024-03-19 RX ORDER — LINACLOTIDE 145 UG/1
TAKE 1 CAPSULE BY MOUTH EVERY DAY AT LEAST 30 MINUTES BEFORE THE FIRST MEAL OF THE DAY ON AN EMPTY STOMACH CAPSULE, GELATIN COATED ORAL
Qty: 30 CAPSULE | Refills: 3 | Status: ACTIVE | COMMUNITY

## 2024-03-19 RX ORDER — LINACLOTIDE 290 UG/1
1 CAPSULE AT LEAST 30 MINUTES BEFORE THE FIRST MEAL OF THE DAY ON AN EMPTY STOMACH CAPSULE, GELATIN COATED ORAL ONCE A DAY
Qty: 30 | Refills: 11 | Status: ACTIVE | COMMUNITY
Start: 2023-12-12 | End: 2024-12-06

## 2024-03-19 RX ORDER — SUCRALFATE 1 G/1
1 TABLET ON AN EMPTY STOMACH TABLET ORAL
Qty: 90 | Refills: 6 | Status: ON HOLD | COMMUNITY

## 2024-03-19 RX ORDER — FAMOTIDINE 40 MG/1
1 TABLET AT BEDTIME TABLET, FILM COATED ORAL ONCE A DAY
Qty: 30 TABLET | Refills: 6 | Status: ACTIVE | COMMUNITY

## 2024-03-19 RX ORDER — RISANKIZUMAB-RZAA 360 MG/2.4
AS DIRECTED WEARABLE INJECTOR SUBCUTANEOUS
Qty: 1 | Refills: 5 | Status: ON HOLD | COMMUNITY
Start: 2023-11-15 | End: 2024-10-16

## 2024-03-19 RX ORDER — HYDROCHLOROTHIAZIDE 25 MG/1
1 TABLET IN THE MORNING TABLET ORAL ONCE A DAY
Status: ACTIVE | COMMUNITY

## 2024-03-19 RX ORDER — PREDNISONE 10 MG/1
TAKE 4 TABLETS BY MOUTH ONCE A DAY. TAKE WITH FOOD TABLET ORAL
Qty: 120 TABLET | Refills: 1 | Status: ACTIVE | COMMUNITY

## 2024-03-19 RX ORDER — FAMOTIDINE 20 MG/1
1 TABLET TABLET, FILM COATED ORAL TWICE A DAY
Qty: 60 TABLET | Refills: 3 | Status: ON HOLD | COMMUNITY

## 2024-03-19 RX ORDER — VEDOLIZUMAB 300 MG/5ML
AS DIRECTED INJECTION, POWDER, LYOPHILIZED, FOR SOLUTION INTRAVENOUS
Qty: 300 | Refills: 7 | Status: ACTIVE | COMMUNITY
Start: 2024-01-26 | End: 2024-09-22

## 2024-03-19 RX ORDER — ADALIMUMAB 40MG/0.8ML
0.8 ML KIT SUBCUTANEOUS EVERY 2 WEEKS
Qty: 1 | Refills: 11 | Status: ON HOLD | COMMUNITY

## 2024-03-19 RX ORDER — PANTOPRAZOLE SODIUM 40 MG/1
1 TABLET TABLET, DELAYED RELEASE ORAL TWICE DAILY
Qty: 60 | Refills: 11 | Status: ACTIVE | COMMUNITY
Start: 2023-12-12

## 2024-03-19 RX ORDER — PREDNISONE 10 MG/1
4 TABLETS TABLET ORAL ONCE A DAY
Qty: 120 TABLET | Refills: 1 | Status: ACTIVE | COMMUNITY
Start: 2024-01-26 | End: 2024-03-26

## 2024-03-19 RX ORDER — POLYETHYLENE GLYCOL 3350, SODIUM CHLORIDE, SODIUM BICARBONATE, POTASSIUM CHLORIDE 420; 11.2; 5.72; 1.48 G/4L; G/4L; G/4L; G/4L
WHILE ON A CLEAR LIQUID DIET DRINK 8 OZ EVERY 15 MIN UNTIL GONE 2 DAYS BEFORE COLONOSCOPY AND DRINK SECOND BOTTLE AS DIRECTED ON PREP SHEET DAY BEFORE THE COLONOSCOPY POWDER, FOR SOLUTION ORAL
Qty: 2 PACK | Refills: 0 | Status: ON HOLD | COMMUNITY

## 2024-03-19 RX ORDER — ISOPROPYL ALCOHOL 91 ML/100ML
TAKE 1 CAPSULE AT BEDTIME LIQUID TOPICAL
Refills: 0 | Status: ACTIVE | COMMUNITY
Start: 2011-08-01

## 2024-03-19 RX ORDER — DICYCLOMINE HYDROCHLORIDE 10 MG/1
1 TABLET CAPSULE ORAL
Qty: 120 | Refills: 3 | Status: ACTIVE | COMMUNITY
Start: 2023-12-12 | End: 2024-04-10

## 2024-03-19 RX ORDER — PREDNISONE 10 MG/1
4 TABS TABLET ORAL ONCE A DAY
Status: ACTIVE | COMMUNITY

## 2024-03-19 RX ORDER — VALSARTAN 80 MG/1
1 TABLET TABLET, FILM COATED ORAL ONCE A DAY
Status: ACTIVE | COMMUNITY

## 2024-03-22 ENCOUNTER — OV EP (OUTPATIENT)
Dept: URBAN - METROPOLITAN AREA CLINIC 107 | Facility: CLINIC | Age: 49
End: 2024-03-22

## 2024-03-22 RX ORDER — LINACLOTIDE 290 UG/1
1 CAPSULE AT LEAST 30 MINUTES BEFORE THE FIRST MEAL OF THE DAY ON AN EMPTY STOMACH CAPSULE, GELATIN COATED ORAL ONCE A DAY
Qty: 30 | Refills: 11 | Status: ACTIVE | COMMUNITY
Start: 2023-12-12 | End: 2024-12-06

## 2024-03-22 RX ORDER — PANTOPRAZOLE SODIUM 40 MG/1
1 TABLET TABLET, DELAYED RELEASE ORAL ONCE A DAY
Qty: 30 TABLET | Refills: 3 | Status: ACTIVE | COMMUNITY

## 2024-03-22 RX ORDER — LINACLOTIDE 145 UG/1
TAKE 1 CAPSULE BY MOUTH EVERY DAY AT LEAST 30 MINUTES BEFORE THE FIRST MEAL OF THE DAY ON AN EMPTY STOMACH CAPSULE, GELATIN COATED ORAL
Qty: 30 CAPSULE | Refills: 3 | Status: ACTIVE | COMMUNITY

## 2024-03-22 RX ORDER — HYDROCHLOROTHIAZIDE 25 MG/1
1 TABLET IN THE MORNING TABLET ORAL ONCE A DAY
Status: ACTIVE | COMMUNITY

## 2024-03-22 RX ORDER — VALSARTAN 80 MG/1
1 TABLET TABLET, FILM COATED ORAL ONCE A DAY
Status: ACTIVE | COMMUNITY

## 2024-03-22 RX ORDER — PANTOPRAZOLE SODIUM 40 MG/1
1 TABLET TABLET, DELAYED RELEASE ORAL TWICE DAILY
Qty: 60 | Refills: 11 | Status: ACTIVE | COMMUNITY
Start: 2023-12-12

## 2024-03-22 RX ORDER — PREDNISONE 10 MG/1
TAKE 4 TABLETS BY MOUTH ONCE A DAY. TAKE WITH FOOD TABLET ORAL
Qty: 120 TABLET | Refills: 1 | Status: ACTIVE | COMMUNITY

## 2024-03-22 RX ORDER — PREDNISONE 10 MG/1
4 TABLETS TABLET ORAL ONCE A DAY
Qty: 120 TABLET | Refills: 1 | Status: ACTIVE | COMMUNITY
Start: 2024-01-26 | End: 2024-03-26

## 2024-03-22 RX ORDER — SUCRALFATE 1 G/1
1 TABLET ON AN EMPTY STOMACH TABLET ORAL
Qty: 90 | Refills: 6 | Status: ON HOLD | COMMUNITY

## 2024-03-22 RX ORDER — RISANKIZUMAB-RZAA 360 MG/2.4
AS DIRECTED WEARABLE INJECTOR SUBCUTANEOUS
Qty: 1 | Refills: 5 | Status: ON HOLD | COMMUNITY
Start: 2023-11-15 | End: 2024-10-16

## 2024-03-22 RX ORDER — PREDNISONE 10 MG/1
4 TABS TABLET ORAL ONCE A DAY
Status: ACTIVE | COMMUNITY

## 2024-03-22 RX ORDER — ISOPROPYL ALCOHOL 91 ML/100ML
TAKE 1 CAPSULE AT BEDTIME LIQUID TOPICAL
Refills: 0 | Status: ACTIVE | COMMUNITY
Start: 2011-08-01

## 2024-03-22 RX ORDER — FAMOTIDINE 40 MG/1
1 TABLET AT BEDTIME TABLET, FILM COATED ORAL ONCE A DAY
Qty: 30 TABLET | Refills: 6 | Status: ACTIVE | COMMUNITY

## 2024-03-22 RX ORDER — ADALIMUMAB 40MG/0.8ML
0.8 ML KIT SUBCUTANEOUS EVERY 2 WEEKS
Qty: 1 | Refills: 11 | Status: ON HOLD | COMMUNITY

## 2024-03-22 RX ORDER — POLYETHYLENE GLYCOL 3350, SODIUM CHLORIDE, SODIUM BICARBONATE, POTASSIUM CHLORIDE 420; 11.2; 5.72; 1.48 G/4L; G/4L; G/4L; G/4L
WHILE ON A CLEAR LIQUID DIET DRINK 8 OZ EVERY 15 MIN UNTIL GONE 2 DAYS BEFORE COLONOSCOPY AND DRINK SECOND BOTTLE AS DIRECTED ON PREP SHEET DAY BEFORE THE COLONOSCOPY POWDER, FOR SOLUTION ORAL
Qty: 2 PACK | Refills: 0 | Status: ON HOLD | COMMUNITY

## 2024-03-22 RX ORDER — DICYCLOMINE HYDROCHLORIDE 10 MG/1
1 TABLET CAPSULE ORAL
Qty: 120 | Refills: 3 | Status: ACTIVE | COMMUNITY
Start: 2023-12-12 | End: 2024-04-10

## 2024-03-22 RX ORDER — VEDOLIZUMAB 300 MG/5ML
AS DIRECTED INJECTION, POWDER, LYOPHILIZED, FOR SOLUTION INTRAVENOUS
Qty: 300 | Refills: 7 | Status: ACTIVE | COMMUNITY
Start: 2024-01-26 | End: 2024-09-22

## 2024-03-22 RX ORDER — FAMOTIDINE 20 MG/1
1 TABLET TABLET, FILM COATED ORAL TWICE A DAY
Qty: 60 TABLET | Refills: 3 | Status: ON HOLD | COMMUNITY

## 2024-03-22 NOTE — HPI-TODAY'S VISIT:
This is a 47-year-old female with a history of anxiety, Crohn's colitis on Humira 40 mg subcu every 2 weeks, GERD, pharyngoesophageal dysphagia, periumbilical abdominal pain, and constipation presenting for follow-up. She was last seen here on 12/12/23.  She was previously seen in the office 11/30/2023.  She was suboptimally controlled on prednisone 40 mg daily.  It was discussed this was not a maintenance medication.  We stressed that she needed maintenance therapy with a biologic.  In the interim, she was to begin Cipro and Flagyl and use Librax for crampy abdominal pain.  Labs and a CT were ordered.  Labs 11/30/2023:ESR 29.  CRP 9.6.  BMP normal with exception of glucose 107, potassium 3.1, chloride 94.  LFTs normal TB 0.3, ALP 53, ALT 21, AST 18.  CBC: WBC 10, hemoglobin 12.1, MCV 88.4, platelet 387.  At her 12/12/23 OV, she was taking pantoprazole in the morning and famotidine at bedtime but was still having frequent daily heartburn and excessive belching.  She described chronic difficulty swallowing meat, reporting that it lodged in her throat and she had to "gag it up."  She had occasional mild nausea without vomiting.  She was taking Linzess 145 mcg for constipation up to 3 capsules per day, and was only having 3-4 bowel movements per week.  Her stools typically began as hard and progressed to watery.  She had occasional small-volume blood on the tissue.  She was taking dicyclomine for abdominal cramps and was requiring it frequently.  Her insurance would not cover Stelara.  She has an email regarding patient assistance for [employer].  She has a form in the email that needs to be completed by herself and a portion that needs to be completed by her medical provider. She continued to take prednisone 40 mg daily.  She did not  prescriptions for Librax or Cipro or metronidazole.  She was eventually approved for Entyvio.  The patient has apparently been receiving pain medications from her primary care physician, and I received a phone call from her mother earlier last week that she was simply taking pain medication around-the-clock and was not keeping appointments for her Crohn's medication.  She was apparently drowsy around-the-clock and although she complained vaguely of pain, she could not specify where this pain was.  I stressed and a conversation with the patient's mother that she was likely taking too much pain medication and that this needed to be controlled.  I also stressed how important it was for her to get on her biologic therapy so that we could finish tapering off her prednisone.  The patient underwent initial Entyvio dosing on March 4, 2024.  She missed her second induction dose on March 18, 2024, but did receive that on March 19.  We elected to start tapering her prednisone by 5 mg/week this week.  She is continued on Linzess 290 mcg daily, adding MiraLAX into this regimen.  She was also continued on pantoprazole 40 mg p.o. twice daily plus as needed Pepcid. She returns today for follow-up.

## 2024-04-15 ENCOUNTER — ENT (OUTPATIENT)
Dept: URBAN - METROPOLITAN AREA CLINIC 112 | Facility: CLINIC | Age: 49
End: 2024-04-15

## 2024-09-27 ENCOUNTER — OFFICE VISIT (OUTPATIENT)
Dept: URBAN - METROPOLITAN AREA CLINIC 113 | Facility: CLINIC | Age: 49
End: 2024-09-27

## 2024-10-22 ENCOUNTER — OFFICE VISIT (OUTPATIENT)
Dept: URBAN - METROPOLITAN AREA CLINIC 113 | Facility: CLINIC | Age: 49
End: 2024-10-22

## 2024-10-22 RX ORDER — PANTOPRAZOLE SODIUM 40 MG/1
1 TABLET TABLET, DELAYED RELEASE ORAL TWICE DAILY
Qty: 60 | Refills: 11 | Status: ACTIVE | COMMUNITY
Start: 2023-12-12

## 2024-10-22 RX ORDER — ADALIMUMAB 40MG/0.8ML
0.8 ML KIT SUBCUTANEOUS EVERY 2 WEEKS
Qty: 1 | Refills: 11 | Status: ON HOLD | COMMUNITY

## 2024-10-22 RX ORDER — PREDNISONE 10 MG/1
TAKE 4 TABLETS BY MOUTH ONCE A DAY. TAKE WITH FOOD TABLET ORAL
Qty: 120 TABLET | Refills: 1 | Status: ACTIVE | COMMUNITY

## 2024-10-22 RX ORDER — SUCRALFATE 1 G/1
1 TABLET ON AN EMPTY STOMACH TABLET ORAL
Qty: 90 | Refills: 6 | Status: ON HOLD | COMMUNITY

## 2024-10-22 RX ORDER — LINACLOTIDE 145 UG/1
TAKE 1 CAPSULE BY MOUTH EVERY DAY AT LEAST 30 MINUTES BEFORE THE FIRST MEAL OF THE DAY ON AN EMPTY STOMACH CAPSULE, GELATIN COATED ORAL
Qty: 30 CAPSULE | Refills: 3 | Status: ACTIVE | COMMUNITY

## 2024-10-22 RX ORDER — PANTOPRAZOLE SODIUM 40 MG/1
1 TABLET TABLET, DELAYED RELEASE ORAL ONCE A DAY
Qty: 30 TABLET | Refills: 3 | Status: ACTIVE | COMMUNITY

## 2024-10-22 RX ORDER — ISOPROPYL ALCOHOL 91 ML/100ML
TAKE 1 CAPSULE AT BEDTIME LIQUID TOPICAL
Refills: 0 | Status: ACTIVE | COMMUNITY
Start: 2011-08-01

## 2024-10-22 RX ORDER — VALSARTAN 80 MG/1
1 TABLET TABLET, FILM COATED ORAL ONCE A DAY
Status: ACTIVE | COMMUNITY

## 2024-10-22 RX ORDER — HYDROCHLOROTHIAZIDE 25 MG/1
1 TABLET IN THE MORNING TABLET ORAL ONCE A DAY
Status: ACTIVE | COMMUNITY

## 2024-10-22 RX ORDER — FAMOTIDINE 20 MG/1
1 TABLET TABLET, FILM COATED ORAL TWICE A DAY
Qty: 60 TABLET | Refills: 3 | Status: ON HOLD | COMMUNITY

## 2024-10-22 RX ORDER — FAMOTIDINE 40 MG/1
1 TABLET AT BEDTIME TABLET, FILM COATED ORAL ONCE A DAY
Qty: 30 TABLET | Refills: 6 | Status: ACTIVE | COMMUNITY

## 2024-10-22 RX ORDER — PREDNISONE 10 MG/1
4 TABS TABLET ORAL ONCE A DAY
Status: ACTIVE | COMMUNITY

## 2024-10-22 RX ORDER — LINACLOTIDE 290 UG/1
1 CAPSULE AT LEAST 30 MINUTES BEFORE THE FIRST MEAL OF THE DAY ON AN EMPTY STOMACH CAPSULE, GELATIN COATED ORAL ONCE A DAY
Qty: 30 | Refills: 11 | Status: ACTIVE | COMMUNITY
Start: 2023-12-12 | End: 2024-12-06

## 2024-10-22 RX ORDER — POLYETHYLENE GLYCOL 3350, SODIUM CHLORIDE, SODIUM BICARBONATE, POTASSIUM CHLORIDE 420; 11.2; 5.72; 1.48 G/4L; G/4L; G/4L; G/4L
WHILE ON A CLEAR LIQUID DIET DRINK 8 OZ EVERY 15 MIN UNTIL GONE 2 DAYS BEFORE COLONOSCOPY AND DRINK SECOND BOTTLE AS DIRECTED ON PREP SHEET DAY BEFORE THE COLONOSCOPY POWDER, FOR SOLUTION ORAL
Qty: 2 PACK | Refills: 0 | Status: ON HOLD | COMMUNITY

## 2024-12-18 ENCOUNTER — OFFICE VISIT (OUTPATIENT)
Dept: URBAN - METROPOLITAN AREA CLINIC 113 | Facility: CLINIC | Age: 49
End: 2024-12-18

## 2024-12-18 RX ORDER — PREDNISONE 10 MG/1
TAKE 4 TABLETS BY MOUTH ONCE A DAY. TAKE WITH FOOD TABLET ORAL
Qty: 120 TABLET | Refills: 1 | Status: ACTIVE | COMMUNITY

## 2024-12-18 RX ORDER — PREDNISONE 10 MG/1
4 TABS TABLET ORAL ONCE A DAY
Status: ACTIVE | COMMUNITY

## 2024-12-18 RX ORDER — PANTOPRAZOLE SODIUM 40 MG/1
1 TABLET TABLET, DELAYED RELEASE ORAL ONCE A DAY
Qty: 30 TABLET | Refills: 3 | Status: ACTIVE | COMMUNITY

## 2024-12-18 RX ORDER — POLYETHYLENE GLYCOL 3350, SODIUM CHLORIDE, SODIUM BICARBONATE, POTASSIUM CHLORIDE 420; 11.2; 5.72; 1.48 G/4L; G/4L; G/4L; G/4L
WHILE ON A CLEAR LIQUID DIET DRINK 8 OZ EVERY 15 MIN UNTIL GONE 2 DAYS BEFORE COLONOSCOPY AND DRINK SECOND BOTTLE AS DIRECTED ON PREP SHEET DAY BEFORE THE COLONOSCOPY POWDER, FOR SOLUTION ORAL
Qty: 2 PACK | Refills: 0 | Status: ON HOLD | COMMUNITY

## 2024-12-18 RX ORDER — FAMOTIDINE 40 MG/1
1 TABLET AT BEDTIME TABLET, FILM COATED ORAL ONCE A DAY
Qty: 30 TABLET | Refills: 6 | Status: ACTIVE | COMMUNITY

## 2024-12-18 RX ORDER — FAMOTIDINE 20 MG/1
1 TABLET TABLET, FILM COATED ORAL TWICE A DAY
Qty: 60 TABLET | Refills: 3 | Status: ON HOLD | COMMUNITY

## 2024-12-18 RX ORDER — ISOPROPYL ALCOHOL 91 ML/100ML
TAKE 1 CAPSULE AT BEDTIME LIQUID TOPICAL
Refills: 0 | Status: ACTIVE | COMMUNITY
Start: 2011-08-01

## 2024-12-18 RX ORDER — SUCRALFATE 1 G/1
1 TABLET ON AN EMPTY STOMACH TABLET ORAL
Qty: 90 | Refills: 6 | Status: ON HOLD | COMMUNITY

## 2024-12-18 RX ORDER — HYDROCHLOROTHIAZIDE 25 MG/1
1 TABLET IN THE MORNING TABLET ORAL ONCE A DAY
Status: ACTIVE | COMMUNITY

## 2024-12-18 RX ORDER — VALSARTAN 80 MG/1
1 TABLET TABLET, FILM COATED ORAL ONCE A DAY
Status: ACTIVE | COMMUNITY

## 2024-12-18 RX ORDER — ADALIMUMAB 40MG/0.8ML
0.8 ML KIT SUBCUTANEOUS EVERY 2 WEEKS
Qty: 1 | Refills: 11 | Status: ON HOLD | COMMUNITY

## 2024-12-18 RX ORDER — LINACLOTIDE 145 UG/1
TAKE 1 CAPSULE BY MOUTH EVERY DAY AT LEAST 30 MINUTES BEFORE THE FIRST MEAL OF THE DAY ON AN EMPTY STOMACH CAPSULE, GELATIN COATED ORAL
Qty: 30 CAPSULE | Refills: 3 | Status: ACTIVE | COMMUNITY

## 2024-12-18 RX ORDER — PANTOPRAZOLE SODIUM 40 MG/1
1 TABLET TABLET, DELAYED RELEASE ORAL TWICE DAILY
Qty: 60 | Refills: 11 | Status: ACTIVE | COMMUNITY
Start: 2023-12-12

## 2024-12-19 ENCOUNTER — ERX REFILL RESPONSE (OUTPATIENT)
Dept: URBAN - METROPOLITAN AREA CLINIC 107 | Facility: CLINIC | Age: 49
End: 2024-12-19

## 2024-12-19 RX ORDER — PREDNISONE 10 MG/1
TAKE 4 TABLETS ORALLY ONCE A DAY WITH FOOD TABLET ORAL
Qty: 120 TABLET | Refills: 2 | OUTPATIENT

## 2024-12-19 RX ORDER — PREDNISONE 10 MG/1
TAKE 4 TABLETS ORALLY ONCE A DAY WITH FOOD TABLET ORAL
Qty: 120 TABLET | Refills: 1 | OUTPATIENT

## 2024-12-20 ENCOUNTER — ERX REFILL RESPONSE (OUTPATIENT)
Dept: URBAN - METROPOLITAN AREA CLINIC 113 | Facility: CLINIC | Age: 49
End: 2024-12-20

## 2024-12-20 RX ORDER — DICYCLOMINE HYDROCHLORIDE 10 MG/1
1 TABLET CAPSULE ORAL
Qty: 60 | Refills: 6 | OUTPATIENT

## 2024-12-20 RX ORDER — DICYCLOMINE HYDROCHLORIDE 10 MG/1
TAKE 1 TABLET ORALLY EVERY 6 HOURS AS NEEDED FOR ABDOMINAL PAIN CAPSULE ORAL
Qty: 60 CAPSULE | Refills: 7 | OUTPATIENT

## 2025-02-06 ENCOUNTER — OFFICE VISIT (OUTPATIENT)
Dept: URBAN - METROPOLITAN AREA CLINIC 107 | Facility: CLINIC | Age: 50
End: 2025-02-06

## 2025-02-14 ENCOUNTER — OFFICE VISIT (OUTPATIENT)
Dept: URBAN - METROPOLITAN AREA CLINIC 107 | Facility: CLINIC | Age: 50
End: 2025-02-14

## 2025-02-14 RX ORDER — DICYCLOMINE HYDROCHLORIDE 10 MG/1
1 TABLET CAPSULE ORAL
Qty: 60 | Refills: 6 | Status: ACTIVE | COMMUNITY

## 2025-02-14 RX ORDER — ADALIMUMAB 40MG/0.8ML
0.8 ML KIT SUBCUTANEOUS EVERY 2 WEEKS
Qty: 1 | Refills: 11 | Status: ON HOLD | COMMUNITY

## 2025-02-14 RX ORDER — POLYETHYLENE GLYCOL 3350, SODIUM CHLORIDE, SODIUM BICARBONATE, POTASSIUM CHLORIDE 420; 11.2; 5.72; 1.48 G/4L; G/4L; G/4L; G/4L
WHILE ON A CLEAR LIQUID DIET DRINK 8 OZ EVERY 15 MIN UNTIL GONE 2 DAYS BEFORE COLONOSCOPY AND DRINK SECOND BOTTLE AS DIRECTED ON PREP SHEET DAY BEFORE THE COLONOSCOPY POWDER, FOR SOLUTION ORAL
Qty: 2 PACK | Refills: 0 | Status: ON HOLD | COMMUNITY

## 2025-02-14 RX ORDER — ISOPROPYL ALCOHOL 91 ML/100ML
TAKE 1 CAPSULE AT BEDTIME LIQUID TOPICAL
Refills: 0 | Status: ACTIVE | COMMUNITY
Start: 2011-08-01

## 2025-02-14 RX ORDER — PANTOPRAZOLE SODIUM 40 MG/1
1 TABLET TABLET, DELAYED RELEASE ORAL TWICE DAILY
Qty: 60 | Refills: 11 | Status: ACTIVE | COMMUNITY
Start: 2023-12-12

## 2025-02-14 RX ORDER — PREDNISONE 10 MG/1
TAKE 4 TABLETS ORALLY ONCE A DAY WITH FOOD TABLET ORAL
Qty: 120 TABLET | Refills: 1 | Status: ACTIVE | COMMUNITY

## 2025-02-14 RX ORDER — FAMOTIDINE 20 MG/1
1 TABLET TABLET, FILM COATED ORAL TWICE A DAY
Qty: 60 TABLET | Refills: 3 | Status: ON HOLD | COMMUNITY

## 2025-02-14 RX ORDER — HYDROCHLOROTHIAZIDE 25 MG/1
1 TABLET IN THE MORNING TABLET ORAL ONCE A DAY
Status: ACTIVE | COMMUNITY

## 2025-02-14 RX ORDER — VALSARTAN 80 MG/1
1 TABLET TABLET, FILM COATED ORAL ONCE A DAY
Status: ACTIVE | COMMUNITY

## 2025-02-14 RX ORDER — PANTOPRAZOLE SODIUM 40 MG/1
1 TABLET TABLET, DELAYED RELEASE ORAL ONCE A DAY
Qty: 30 TABLET | Refills: 3 | Status: ACTIVE | COMMUNITY

## 2025-02-14 RX ORDER — SUCRALFATE 1 G/1
1 TABLET ON AN EMPTY STOMACH TABLET ORAL
Qty: 90 | Refills: 6 | Status: ON HOLD | COMMUNITY

## 2025-02-14 RX ORDER — FAMOTIDINE 40 MG/1
1 TABLET AT BEDTIME TABLET, FILM COATED ORAL ONCE A DAY
Qty: 30 TABLET | Refills: 6 | Status: ACTIVE | COMMUNITY

## 2025-02-14 RX ORDER — LINACLOTIDE 145 UG/1
TAKE 1 CAPSULE BY MOUTH EVERY DAY AT LEAST 30 MINUTES BEFORE THE FIRST MEAL OF THE DAY ON AN EMPTY STOMACH CAPSULE, GELATIN COATED ORAL
Qty: 30 CAPSULE | Refills: 3 | Status: ACTIVE | COMMUNITY

## 2025-02-14 NOTE — HPI-TODAY'S VISIT:
This is a 47-year-old female with a history of anxiety, Crohn's colitis on Humira 40 mg subcu every 2 weeks, GERD, pharyngoesophageal dysphagia, periumbilical abdominal pain, and constipation presenting for follow-up. She was last seen here on 12/12/23.  She was previously seen in the office 11/30/2023.  She was suboptimally controlled on prednisone 40 mg daily.  It was discussed this was not a maintenance medication.  We stressed that she needed maintenance therapy with a biologic.  In the interim, she was to begin Cipro and Flagyl and use Librax for crampy abdominal pain.  Labs and a CT were ordered.  Labs 11/30/2023:ESR 29.  CRP 9.6.  BMP normal with exception of glucose 107, potassium 3.1, chloride 94.  LFTs normal TB 0.3, ALP 53, ALT 21, AST 18.  CBC: WBC 10, hemoglobin 12.1, MCV 88.4, platelet 387.  At her 12/12/23 OV, she was taking pantoprazole in the morning and famotidine at bedtime but was still having frequent daily heartburn and excessive belching.  She described chronic difficulty swallowing meat, reporting that it lodged in her throat and she had to "gag it up."  She had occasional mild nausea without vomiting.  She was taking Linzess 145 mcg for constipation up to 3 capsules per day, and was only having 3-4 bowel movements per week.  Her stools typically began as hard and progressed to watery.  She had occasional small-volume blood on the tissue.  She was taking dicyclomine for abdominal cramps and was requiring it frequently.  Her insurance would not cover Stelara.  She has an email regarding patient assistance for [employer].  She has a form in the email that needs to be completed by herself and a portion that needs to be completed by her medical provider. She continued to take prednisone 40 mg daily.  She did not  prescriptions for Librax or Cipro or metronidazole.  She was eventually approved for Entyvio.  The patient has apparently been receiving pain medications from her primary care physician, and I received a phone call from her mother earlier last week that she was simply taking pain medication around-the-clock and was not keeping appointments for her Crohn's medication.  She was apparently drowsy around-the-clock and although she complained vaguely of pain, she could not specify where this pain was.  I stressed and a conversation with the patient's mother that she was likely taking too much pain medication and that this needed to be controlled.  I also stressed how important it was for her to get on her biologic therapy so that we could finish tapering off her prednisone.  The patient underwent initial Entyvio dosing on March 4, 2024.  She missed her second induction dose on March 18, 2024, but did receive that on March 19.  We elected to start tapering her prednisone by 5 mg/week this week.  She is continued on Linzess 290 mcg daily, adding MiraLAX into this regimen.  She was also continued on pantoprazole 40 mg p.o. twice daily plus as needed Pepcid. She returns today for follow-up. Labs 3/8/2024.  LFTs normal. Labs 11/19/2024.  CBC normal with Hgb 13.7.  CMP: AST 96, ALT 78.  ESR and CRP normal.

## 2025-05-19 ENCOUNTER — OFFICE VISIT (OUTPATIENT)
Dept: URBAN - METROPOLITAN AREA CLINIC 107 | Facility: CLINIC | Age: 50
End: 2025-05-19

## 2025-05-19 RX ORDER — PANTOPRAZOLE SODIUM 40 MG/1
1 TABLET TABLET, DELAYED RELEASE ORAL ONCE A DAY
Qty: 30 TABLET | Refills: 3 | Status: ACTIVE | COMMUNITY

## 2025-05-19 RX ORDER — LINACLOTIDE 145 UG/1
TAKE 1 CAPSULE BY MOUTH EVERY DAY AT LEAST 30 MINUTES BEFORE THE FIRST MEAL OF THE DAY ON AN EMPTY STOMACH CAPSULE, GELATIN COATED ORAL
Qty: 30 CAPSULE | Refills: 3 | Status: ACTIVE | COMMUNITY

## 2025-05-19 RX ORDER — PREDNISONE 10 MG/1
TAKE 4 TABLETS ORALLY ONCE A DAY WITH FOOD TABLET ORAL
Qty: 120 TABLET | Refills: 1 | Status: ACTIVE | COMMUNITY

## 2025-05-19 RX ORDER — FAMOTIDINE 40 MG/1
1 TABLET AT BEDTIME TABLET, FILM COATED ORAL ONCE A DAY
Qty: 30 TABLET | Refills: 6 | Status: ACTIVE | COMMUNITY

## 2025-05-19 RX ORDER — DICYCLOMINE HYDROCHLORIDE 10 MG/1
1 TABLET CAPSULE ORAL
Qty: 60 | Refills: 6 | Status: ACTIVE | COMMUNITY

## 2025-05-19 RX ORDER — ISOPROPYL ALCOHOL 91 ML/100ML
TAKE 1 CAPSULE AT BEDTIME LIQUID TOPICAL
Refills: 0 | Status: ACTIVE | COMMUNITY
Start: 2011-08-01

## 2025-05-19 RX ORDER — VALSARTAN 80 MG/1
1 TABLET TABLET, FILM COATED ORAL ONCE A DAY
Status: ACTIVE | COMMUNITY

## 2025-05-19 RX ORDER — SUCRALFATE 1 G/1
1 TABLET ON AN EMPTY STOMACH TABLET ORAL
Qty: 90 | Refills: 6 | Status: ON HOLD | COMMUNITY

## 2025-05-19 RX ORDER — FAMOTIDINE 20 MG/1
1 TABLET TABLET, FILM COATED ORAL TWICE A DAY
Qty: 60 TABLET | Refills: 3 | Status: ON HOLD | COMMUNITY

## 2025-05-19 RX ORDER — POLYETHYLENE GLYCOL 3350, SODIUM CHLORIDE, SODIUM BICARBONATE, POTASSIUM CHLORIDE 420; 11.2; 5.72; 1.48 G/4L; G/4L; G/4L; G/4L
WHILE ON A CLEAR LIQUID DIET DRINK 8 OZ EVERY 15 MIN UNTIL GONE 2 DAYS BEFORE COLONOSCOPY AND DRINK SECOND BOTTLE AS DIRECTED ON PREP SHEET DAY BEFORE THE COLONOSCOPY POWDER, FOR SOLUTION ORAL
Qty: 2 PACK | Refills: 0 | Status: ON HOLD | COMMUNITY

## 2025-05-19 RX ORDER — HYDROCHLOROTHIAZIDE 25 MG/1
1 TABLET IN THE MORNING TABLET ORAL ONCE A DAY
Status: ACTIVE | COMMUNITY

## 2025-05-19 RX ORDER — ADALIMUMAB 40MG/0.8ML
0.8 ML KIT SUBCUTANEOUS EVERY 2 WEEKS
Qty: 1 | Refills: 11 | Status: ON HOLD | COMMUNITY

## 2025-05-19 RX ORDER — PANTOPRAZOLE SODIUM 40 MG/1
1 TABLET TABLET, DELAYED RELEASE ORAL TWICE DAILY
Qty: 60 | Refills: 11 | Status: ACTIVE | COMMUNITY
Start: 2023-12-12

## 2025-05-19 NOTE — HPI-TODAY'S VISIT:
This is a 47-year-old female with a history of anxiety, Crohn's colitis on Humira 40 mg subcu every 2 weeks, GERD, pharyngoesophageal dysphagia, periumbilical abdominal pain, and constipation presenting for follow-up. She was last seen here on 12/12/23.  She was previously seen in the office 11/30/2023.  She was suboptimally controlled on prednisone 40 mg daily.  It was discussed this was not a maintenance medication.  We stressed that she needed maintenance therapy with a biologic.  In the interim, she was to begin Cipro and Flagyl and use Librax for crampy abdominal pain.  Labs and a CT were ordered.  Labs 11/30/2023:ESR 29.  CRP 9.6.  BMP normal with exception of glucose 107, potassium 3.1, chloride 94.  LFTs normal TB 0.3, ALP 53, ALT 21, AST 18.  CBC: WBC 10, hemoglobin 12.1, MCV 88.4, platelet 387.  At her 12/12/23 OV, she was taking pantoprazole in the morning and famotidine at bedtime but was still having frequent daily heartburn and excessive belching.  She described chronic difficulty swallowing meat, reporting that it lodged in her throat and she had to "gag it up."  She had occasional mild nausea without vomiting.  She was taking Linzess 145 mcg for constipation up to 3 capsules per day, and was only having 3-4 bowel movements per week.  Her stools typically began as hard and progressed to watery.  She had occasional small-volume blood on the tissue.  She was taking dicyclomine for abdominal cramps and was requiring it frequently.  Her insurance would not cover Stelara.  She has an email regarding patient assistance for [employer].  She has a form in the email that needs to be completed by herself and a portion that needs to be completed by her medical provider. She continued to take prednisone 40 mg daily.  She did not  prescriptions for Librax or Cipro or metronidazole.  She was eventually approved for Entyvio.  The patient has apparently been receiving pain medications from her primary care physician, and I received a phone call from her mother earlier last week that she was simply taking pain medication around-the-clock and was not keeping appointments for her Crohn's medication.  She was apparently drowsy around-the-clock and although she complained vaguely of pain, she could not specify where this pain was.  I stressed and a conversation with the patient's mother that she was likely taking too much pain medication and that this needed to be controlled.  I also stressed how important it was for her to get on her biologic therapy so that we could finish tapering off her prednisone.  The patient underwent initial Entyvio dosing on March 4, 2024.  She missed her second induction dose on March 18, 2024, but did receive that on March 19.  We elected to start tapering her prednisone by 5 mg/week this week.  She is continued on Linzess 290 mcg daily, adding MiraLAX into this regimen.  She was also continued on pantoprazole 40 mg p.o. twice daily plus as needed Pepcid. She returns today for follow-up. Labs 3/8/2024.  LFTs normal. Labs 11/19/2024.  CBC normal with Hgb 13.7.  CMP: AST 96, ALT 78.  ESR and CRP normal. Labs 11/19/2024.  CBC normal with Hgb 13.7.  CMP: AST 96, ALT 78.  ESR and CRP normal.  Last Entyvio infusion 3/19/2024, was 2nd dose   Last seen in the office 12/12/2023 for Crohns Colitis on Humira every 2 weeks and on prednisone 40 mg daily.  Course of Cipro and Flagyl were recommended at her last visit, she did not take was represcribed.  Was prescribed [employer], was encouraged to complete patient assistance.  Started on dicylcomine for periumbilical pain, Linzess 290 for constipation.  Can add miralax and titrate to effect.  Started on pantorpazole 40 mg daily for GERD.

## 2025-06-11 ENCOUNTER — TELEPHONE ENCOUNTER (OUTPATIENT)
Dept: URBAN - METROPOLITAN AREA CLINIC 107 | Facility: CLINIC | Age: 50
End: 2025-06-11

## 2025-06-11 ENCOUNTER — OFFICE VISIT (OUTPATIENT)
Dept: URBAN - METROPOLITAN AREA CLINIC 113 | Facility: CLINIC | Age: 50
End: 2025-06-11

## 2025-06-11 RX ORDER — SUCRALFATE 1 G/1
1 TABLET ON AN EMPTY STOMACH TABLET ORAL
Qty: 90 | Refills: 6 | Status: ON HOLD | COMMUNITY

## 2025-06-11 RX ORDER — VALSARTAN 80 MG/1
1 TABLET TABLET, FILM COATED ORAL ONCE A DAY
Status: ACTIVE | COMMUNITY

## 2025-06-11 RX ORDER — PANTOPRAZOLE SODIUM 40 MG/1
1 TABLET TABLET, DELAYED RELEASE ORAL TWICE DAILY
Qty: 60 | Refills: 11 | Status: ACTIVE | COMMUNITY
Start: 2023-12-12

## 2025-06-11 RX ORDER — PANTOPRAZOLE SODIUM 40 MG/1
1 TABLET TABLET, DELAYED RELEASE ORAL ONCE A DAY
Qty: 30 TABLET | Refills: 3 | Status: ACTIVE | COMMUNITY

## 2025-06-11 RX ORDER — ISOPROPYL ALCOHOL 91 ML/100ML
TAKE 1 CAPSULE AT BEDTIME LIQUID TOPICAL
Refills: 0 | Status: ACTIVE | COMMUNITY
Start: 2011-08-01

## 2025-06-11 RX ORDER — FAMOTIDINE 40 MG/1
1 TABLET AT BEDTIME TABLET, FILM COATED ORAL ONCE A DAY
Qty: 30 TABLET | Refills: 6 | Status: ACTIVE | COMMUNITY

## 2025-06-11 RX ORDER — DICYCLOMINE HYDROCHLORIDE 10 MG/1
1 TABLET CAPSULE ORAL
Qty: 60 | Refills: 6 | Status: ACTIVE | COMMUNITY

## 2025-06-11 RX ORDER — FAMOTIDINE 20 MG/1
1 TABLET TABLET, FILM COATED ORAL TWICE A DAY
Qty: 60 TABLET | Refills: 3 | Status: ON HOLD | COMMUNITY

## 2025-06-11 RX ORDER — LINACLOTIDE 145 UG/1
TAKE 1 CAPSULE BY MOUTH EVERY DAY AT LEAST 30 MINUTES BEFORE THE FIRST MEAL OF THE DAY ON AN EMPTY STOMACH CAPSULE, GELATIN COATED ORAL
Qty: 30 CAPSULE | Refills: 3 | Status: ACTIVE | COMMUNITY

## 2025-06-11 RX ORDER — POLYETHYLENE GLYCOL 3350, SODIUM CHLORIDE, SODIUM BICARBONATE, POTASSIUM CHLORIDE 420; 11.2; 5.72; 1.48 G/4L; G/4L; G/4L; G/4L
WHILE ON A CLEAR LIQUID DIET DRINK 8 OZ EVERY 15 MIN UNTIL GONE 2 DAYS BEFORE COLONOSCOPY AND DRINK SECOND BOTTLE AS DIRECTED ON PREP SHEET DAY BEFORE THE COLONOSCOPY POWDER, FOR SOLUTION ORAL
Qty: 2 PACK | Refills: 0 | Status: ON HOLD | COMMUNITY

## 2025-06-11 RX ORDER — ADALIMUMAB 40MG/0.8ML
0.8 ML KIT SUBCUTANEOUS EVERY 2 WEEKS
Qty: 1 | Refills: 11 | Status: ON HOLD | COMMUNITY

## 2025-06-11 RX ORDER — PREDNISONE 10 MG/1
TAKE 4 TABLETS ORALLY ONCE A DAY WITH FOOD TABLET ORAL
Qty: 120 TABLET | Refills: 1 | Status: ACTIVE | COMMUNITY

## 2025-06-11 RX ORDER — HYDROCHLOROTHIAZIDE 25 MG/1
1 TABLET IN THE MORNING TABLET ORAL ONCE A DAY
Status: ACTIVE | COMMUNITY

## 2025-06-24 ENCOUNTER — CLAIMS CREATED FROM THE CLAIM WINDOW (OUTPATIENT)
Dept: URBAN - METROPOLITAN AREA MEDICAL CENTER 19 | Facility: MEDICAL CENTER | Age: 50
End: 2025-06-24
Payer: SELF-PAY

## 2025-06-24 DIAGNOSIS — K50.10 CROHN'S DISEASE OF LARGE INTESTINE WITHOUT COMPLICATIONS: ICD-10-CM

## 2025-06-24 DIAGNOSIS — K58.9 IRRITABLE BOWEL SYNDROME, UNSPECIFIED: ICD-10-CM

## 2025-06-24 PROCEDURE — 99254 IP/OBS CNSLTJ NEW/EST MOD 60: CPT | Performed by: INTERNAL MEDICINE

## 2025-06-25 ENCOUNTER — CLAIMS CREATED FROM THE CLAIM WINDOW (OUTPATIENT)
Dept: URBAN - METROPOLITAN AREA MEDICAL CENTER 19 | Facility: MEDICAL CENTER | Age: 50
End: 2025-06-25
Payer: SELF-PAY

## 2025-06-25 DIAGNOSIS — K50.10 CROHN'S DISEASE OF LARGE INTESTINE WITHOUT COMPLICATIONS: ICD-10-CM

## 2025-06-25 DIAGNOSIS — K58.9 IRRITABLE BOWEL SYNDROME, UNSPECIFIED: ICD-10-CM

## 2025-06-25 PROCEDURE — 99232 SBSQ HOSP IP/OBS MODERATE 35: CPT | Performed by: INTERNAL MEDICINE

## 2025-06-30 ENCOUNTER — TELEPHONE ENCOUNTER (OUTPATIENT)
Dept: URBAN - METROPOLITAN AREA CLINIC 113 | Facility: CLINIC | Age: 50
End: 2025-06-30

## 2025-07-01 ENCOUNTER — OFFICE VISIT (OUTPATIENT)
Dept: URBAN - METROPOLITAN AREA CLINIC 113 | Facility: CLINIC | Age: 50
End: 2025-07-01

## 2025-07-01 ENCOUNTER — CLAIMS CREATED FROM THE CLAIM WINDOW (OUTPATIENT)
Dept: URBAN - METROPOLITAN AREA MEDICAL CENTER 19 | Facility: MEDICAL CENTER | Age: 50
End: 2025-07-01
Payer: COMMERCIAL

## 2025-07-01 DIAGNOSIS — K56.7 ILEUS: ICD-10-CM

## 2025-07-01 DIAGNOSIS — R19.7 ACUTE DIARRHEA: ICD-10-CM

## 2025-07-01 DIAGNOSIS — D64.89 ANEMIA DUE TO OTHER CAUSE: ICD-10-CM

## 2025-07-01 DIAGNOSIS — R10.84 ABDOMINAL CRAMPING, GENERALIZED: ICD-10-CM

## 2025-07-01 DIAGNOSIS — K50.818 CROHN'S DISEASE OF BOTH SMALL AND LARGE INTESTINE WITH OTHER COMPLICATION: ICD-10-CM

## 2025-07-01 PROCEDURE — 99232 SBSQ HOSP IP/OBS MODERATE 35: CPT | Performed by: PHYSICIAN ASSISTANT

## 2025-07-01 RX ORDER — DICYCLOMINE HYDROCHLORIDE 10 MG/1
1 TABLET CAPSULE ORAL
Qty: 60 | Refills: 6 | Status: ACTIVE | COMMUNITY

## 2025-07-01 RX ORDER — ISOPROPYL ALCOHOL 91 ML/100ML
TAKE 1 CAPSULE AT BEDTIME LIQUID TOPICAL
Refills: 0 | Status: ACTIVE | COMMUNITY
Start: 2011-08-01

## 2025-07-01 RX ORDER — SUCRALFATE 1 G/1
1 TABLET ON AN EMPTY STOMACH TABLET ORAL
Qty: 90 | Refills: 6 | Status: ON HOLD | COMMUNITY

## 2025-07-01 RX ORDER — VALSARTAN 80 MG/1
1 TABLET TABLET, FILM COATED ORAL ONCE A DAY
Status: ACTIVE | COMMUNITY

## 2025-07-01 RX ORDER — ADALIMUMAB 40MG/0.8ML
0.8 ML KIT SUBCUTANEOUS EVERY 2 WEEKS
Qty: 1 | Refills: 11 | Status: ON HOLD | COMMUNITY

## 2025-07-01 RX ORDER — FAMOTIDINE 40 MG/1
1 TABLET AT BEDTIME TABLET, FILM COATED ORAL ONCE A DAY
Qty: 30 TABLET | Refills: 6 | Status: ACTIVE | COMMUNITY

## 2025-07-01 RX ORDER — PANTOPRAZOLE SODIUM 40 MG/1
1 TABLET TABLET, DELAYED RELEASE ORAL TWICE DAILY
Qty: 60 | Refills: 11 | Status: ACTIVE | COMMUNITY
Start: 2023-12-12

## 2025-07-01 RX ORDER — PREDNISONE 10 MG/1
TAKE 4 TABLETS ORALLY ONCE A DAY WITH FOOD TABLET ORAL
Qty: 120 TABLET | Refills: 1 | Status: ACTIVE | COMMUNITY

## 2025-07-01 RX ORDER — PANTOPRAZOLE SODIUM 40 MG/1
1 TABLET TABLET, DELAYED RELEASE ORAL ONCE A DAY
Qty: 30 TABLET | Refills: 3 | Status: ACTIVE | COMMUNITY

## 2025-07-01 RX ORDER — LINACLOTIDE 145 UG/1
TAKE 1 CAPSULE BY MOUTH EVERY DAY AT LEAST 30 MINUTES BEFORE THE FIRST MEAL OF THE DAY ON AN EMPTY STOMACH CAPSULE, GELATIN COATED ORAL
Qty: 30 CAPSULE | Refills: 3 | Status: ACTIVE | COMMUNITY

## 2025-07-01 RX ORDER — HYDROCHLOROTHIAZIDE 25 MG/1
1 TABLET IN THE MORNING TABLET ORAL ONCE A DAY
Status: ACTIVE | COMMUNITY

## 2025-07-01 RX ORDER — POLYETHYLENE GLYCOL 3350, SODIUM CHLORIDE, SODIUM BICARBONATE, POTASSIUM CHLORIDE 420; 11.2; 5.72; 1.48 G/4L; G/4L; G/4L; G/4L
WHILE ON A CLEAR LIQUID DIET DRINK 8 OZ EVERY 15 MIN UNTIL GONE 2 DAYS BEFORE COLONOSCOPY AND DRINK SECOND BOTTLE AS DIRECTED ON PREP SHEET DAY BEFORE THE COLONOSCOPY POWDER, FOR SOLUTION ORAL
Qty: 2 PACK | Refills: 0 | Status: ON HOLD | COMMUNITY

## 2025-07-01 RX ORDER — FAMOTIDINE 20 MG/1
1 TABLET TABLET, FILM COATED ORAL TWICE A DAY
Qty: 60 TABLET | Refills: 3 | Status: ON HOLD | COMMUNITY

## 2025-07-01 NOTE — HPI-TODAY'S VISIT:
This is a 47-year-old female with a history of anxiety, Crohn's colitis on Humira 40 mg subcu every 2 weeks, GERD, pharyngoesophageal dysphagia, periumbilical abdominal pain, and constipation presenting for follow-up. She was last seen here on 12/12/23.  She was previously seen in the office 11/30/2023.  She was suboptimally controlled on prednisone 40 mg daily.  It was discussed this was not a maintenance medication.  We stressed that she needed maintenance therapy with a biologic.  In the interim, she was to begin Cipro and Flagyl and use Librax for crampy abdominal pain.  Labs and a CT were ordered.  Labs 11/30/2023:ESR 29.  CRP 9.6.  BMP normal with exception of glucose 107, potassium 3.1, chloride 94.  LFTs normal TB 0.3, ALP 53, ALT 21, AST 18.  CBC: WBC 10, hemoglobin 12.1, MCV 88.4, platelet 387.  At her 12/12/23 OV, she was taking pantoprazole in the morning and famotidine at bedtime but was still having frequent daily heartburn and excessive belching.  She described chronic difficulty swallowing meat, reporting that it lodged in her throat and she had to "gag it up."  She had occasional mild nausea without vomiting.  She was taking Linzess 145 mcg for constipation up to 3 capsules per day, and was only having 3-4 bowel movements per week.  Her stools typically began as hard and progressed to watery.  She had occasional small-volume blood on the tissue.  She was taking dicyclomine for abdominal cramps and was requiring it frequently.  Her insurance would not cover Stelara.  She has an email regarding patient assistance for [employer].  She has a form in the email that needs to be completed by herself and a portion that needs to be completed by her medical provider. She continued to take prednisone 40 mg daily.  She did not  prescriptions for Librax or Cipro or metronidazole.  She was eventually approved for Entyvio.  The patient has apparently been receiving pain medications from her primary care physician, and I received a phone call from her mother earlier last week that she was simply taking pain medication around-the-clock and was not keeping appointments for her Crohn's medication.  She was apparently drowsy around-the-clock and although she complained vaguely of pain, she could not specify where this pain was.  I stressed and a conversation with the patient's mother that she was likely taking too much pain medication and that this needed to be controlled.  I also stressed how important it was for her to get on her biologic therapy so that we could finish tapering off her prednisone.  The patient underwent initial Entyvio dosing on March 4, 2024.  She missed her second induction dose on March 18, 2024, but did receive that on March 19.  We elected to start tapering her prednisone by 5 mg/week this week.  She is continued on Linzess 290 mcg daily, adding MiraLAX into this regimen.  She was also continued on pantoprazole 40 mg p.o. twice daily plus as needed Pepcid. She returns today for follow-up. Labs 3/8/2024.  LFTs normal. Labs 11/19/2024.  CBC normal with Hgb 13.7.  CMP: AST 96, ALT 78.  ESR and CRP normal. Labs 11/19/2024.  CBC normal with Hgb 13.7.  CMP: AST 96, ALT 78.  ESR and CRP normal.  Last Entyvio infusion 3/19/2024, was 2nd dose   Last seen in the office 12/12/2023 for Crohns Colitis on Humira every 2 weeks and on prednisone 40 mg daily.  Course of Cipro and Flagyl were recommended at her last visit, she did not take was represcribed.  Was prescribed Skyri[employer], was encouraged to complete patient assistance.  Started on dicylcomine for periumbilical pain, Linzess 290 for constipation.  Can add miralax and titrate to effect.  Started on pantorpazole 40 mg daily for GERD. She called the office 6/11/2025 reporting bloody stools and intention to go to the emergency department for evaluation.  She plan to go to [facility].  It was agreed that she should do so.  She was given a follow-up appointment for today. She was admitted and seen in GI consultation due to Crohn's colitis requiring diverting ileostomy.  The plan was to restart biologic therapy when she recovered from surgery.  Instructions 6/30/2025 were to cancel her visit for 7/1 and have her follow-up in 2 weeks. According to consult note 6/27/2025, she was seen in consultation for Crohn's colitis with exacerbation and possible bowel obstruction concerning for stricturing Crohn's disease.  Surgery was planning diverting ileostomy the following day given severe pancolitis.  It was discussed that she had a longstanding history of Crohn's disease since 2023 with Remicade and Humira failure.  Subsequently on Entyvio that was discontinued 3/24 due to loss of insurance.  She experienced abdominal pain, bloody diarrhea, and weight loss.  CT abdomen and pelvis with contrast on admission showed multifocal long segment inflammation involving entire colon, most prominent rectosigmoid and ascending/transverse colon.  C. difficile negative.  She had minimal improvement on steroid therapy  and there was concern for evolving obstruction.  Her family reported that she had been using cocaine daily.  She had large-volume bleeding the night before the consultation.  A stat CTA was negative for active extravasation.  Labs were notable for anemia determined to be secondary to bleeding.

## 2025-07-02 ENCOUNTER — CLAIMS CREATED FROM THE CLAIM WINDOW (OUTPATIENT)
Dept: URBAN - METROPOLITAN AREA MEDICAL CENTER 19 | Facility: MEDICAL CENTER | Age: 50
End: 2025-07-02
Payer: COMMERCIAL

## 2025-07-02 DIAGNOSIS — D64.89 ANEMIA DUE TO OTHER CAUSE: ICD-10-CM

## 2025-07-02 DIAGNOSIS — K56.7 ILEUS: ICD-10-CM

## 2025-07-02 DIAGNOSIS — R19.7 ACUTE DIARRHEA: ICD-10-CM

## 2025-07-02 DIAGNOSIS — K50.818 CROHN'S DISEASE OF BOTH SMALL AND LARGE INTESTINE WITH OTHER COMPLICATION: ICD-10-CM

## 2025-07-02 DIAGNOSIS — R10.84 ABDOMINAL CRAMPING, GENERALIZED: ICD-10-CM

## 2025-07-02 PROCEDURE — 99232 SBSQ HOSP IP/OBS MODERATE 35: CPT | Performed by: PHYSICIAN ASSISTANT

## 2025-07-03 ENCOUNTER — CLAIMS CREATED FROM THE CLAIM WINDOW (OUTPATIENT)
Dept: URBAN - METROPOLITAN AREA MEDICAL CENTER 19 | Facility: MEDICAL CENTER | Age: 50
End: 2025-07-03
Payer: COMMERCIAL

## 2025-07-03 DIAGNOSIS — K50.818 CROHN'S DISEASE OF BOTH SMALL AND LARGE INTESTINE WITH OTHER COMPLICATION: ICD-10-CM

## 2025-07-03 DIAGNOSIS — R19.7 ACUTE DIARRHEA: ICD-10-CM

## 2025-07-03 DIAGNOSIS — R93.3 ABN FINDINGS-GI TRACT: ICD-10-CM

## 2025-07-03 DIAGNOSIS — R10.84 ABDOMINAL CRAMPING, GENERALIZED: ICD-10-CM

## 2025-07-03 DIAGNOSIS — D64.89 ANEMIA DUE TO OTHER CAUSE: ICD-10-CM

## 2025-07-03 PROCEDURE — 99232 SBSQ HOSP IP/OBS MODERATE 35: CPT | Performed by: PHYSICIAN ASSISTANT

## 2025-07-05 ENCOUNTER — CLAIMS CREATED FROM THE CLAIM WINDOW (OUTPATIENT)
Dept: URBAN - METROPOLITAN AREA MEDICAL CENTER 19 | Facility: MEDICAL CENTER | Age: 50
End: 2025-07-05
Payer: COMMERCIAL

## 2025-07-05 ENCOUNTER — TELEPHONE ENCOUNTER (OUTPATIENT)
Dept: URBAN - METROPOLITAN AREA CLINIC 113 | Facility: CLINIC | Age: 50
End: 2025-07-05

## 2025-07-05 DIAGNOSIS — K50.818 CROHN'S DISEASE OF BOTH SMALL AND LARGE INTESTINE WITH OTHER COMPLICATION: ICD-10-CM

## 2025-07-05 DIAGNOSIS — R93.3 ABN FINDINGS-GI TRACT: ICD-10-CM

## 2025-07-05 PROCEDURE — 99232 SBSQ HOSP IP/OBS MODERATE 35: CPT | Performed by: INTERNAL MEDICINE

## 2025-07-09 ENCOUNTER — CLAIMS CREATED FROM THE CLAIM WINDOW (OUTPATIENT)
Dept: URBAN - METROPOLITAN AREA MEDICAL CENTER 19 | Facility: MEDICAL CENTER | Age: 50
End: 2025-07-09
Payer: COMMERCIAL

## 2025-07-09 DIAGNOSIS — K50.818 CROHN'S DISEASE OF BOTH SMALL AND LARGE INTESTINE WITH OTHER COMPLICATION: ICD-10-CM

## 2025-07-09 DIAGNOSIS — R93.3 ABN FINDINGS-GI TRACT: ICD-10-CM

## 2025-07-09 DIAGNOSIS — F14.10 COCAINE ABUSE, UNCOMPLICATED: ICD-10-CM

## 2025-07-09 PROCEDURE — 99231 SBSQ HOSP IP/OBS SF/LOW 25: CPT | Performed by: PHYSICIAN ASSISTANT

## 2025-07-10 ENCOUNTER — OFFICE VISIT (OUTPATIENT)
Dept: URBAN - METROPOLITAN AREA CLINIC 113 | Facility: CLINIC | Age: 50
End: 2025-07-10

## 2025-07-21 ENCOUNTER — TELEPHONE ENCOUNTER (OUTPATIENT)
Dept: URBAN - METROPOLITAN AREA CLINIC 107 | Facility: CLINIC | Age: 50
End: 2025-07-21

## 2025-07-21 RX ORDER — DICYCLOMINE HYDROCHLORIDE 10 MG/1
1 CAPSULES CAPSULE ORAL
Qty: 90 | Refills: 1

## 2025-07-21 RX ORDER — LOPERAMIDE HCL 2 MG
1 CAPSULE CAPSULE ORAL
Qty: 120 | Refills: 0 | OUTPATIENT
Start: 2025-07-22 | End: 2025-08-21

## 2025-08-04 ENCOUNTER — TELEPHONE ENCOUNTER (OUTPATIENT)
Dept: URBAN - METROPOLITAN AREA CLINIC 113 | Facility: CLINIC | Age: 50
End: 2025-08-04

## 2025-08-05 ENCOUNTER — TELEPHONE ENCOUNTER (OUTPATIENT)
Dept: URBAN - METROPOLITAN AREA CLINIC 113 | Facility: CLINIC | Age: 50
End: 2025-08-05

## 2025-08-07 ENCOUNTER — TELEPHONE ENCOUNTER (OUTPATIENT)
Dept: URBAN - METROPOLITAN AREA CLINIC 107 | Facility: CLINIC | Age: 50
End: 2025-08-07

## 2025-08-07 ENCOUNTER — LAB OUTSIDE AN ENCOUNTER (OUTPATIENT)
Dept: URBAN - METROPOLITAN AREA CLINIC 107 | Facility: CLINIC | Age: 50
End: 2025-08-07

## 2025-08-07 ENCOUNTER — TELEPHONE ENCOUNTER (OUTPATIENT)
Dept: URBAN - METROPOLITAN AREA CLINIC 113 | Facility: CLINIC | Age: 50
End: 2025-08-07

## 2025-08-07 ENCOUNTER — OFFICE VISIT (OUTPATIENT)
Dept: URBAN - METROPOLITAN AREA CLINIC 107 | Facility: CLINIC | Age: 50
End: 2025-08-07
Payer: COMMERCIAL

## 2025-08-07 DIAGNOSIS — K50.119 CROHN'S DISEASE OF COLON WITH COMPLICATION: ICD-10-CM

## 2025-08-07 DIAGNOSIS — R10.31 RIGHT LOWER QUADRANT ABDOMINAL PAIN: ICD-10-CM

## 2025-08-07 DIAGNOSIS — Z98.890 HISTORY OF ILEOSTOMY: ICD-10-CM

## 2025-08-07 PROCEDURE — 99214 OFFICE O/P EST MOD 30 MIN: CPT | Performed by: NURSE PRACTITIONER

## 2025-08-07 RX ORDER — PANTOPRAZOLE SODIUM 40 MG/1
1 TABLET TABLET, DELAYED RELEASE ORAL TWICE DAILY
Qty: 60 | Refills: 11 | Status: DISCONTINUED | COMMUNITY
Start: 2023-12-12

## 2025-08-07 RX ORDER — DICYCLOMINE HYDROCHLORIDE 10 MG/1
1 CAPSULES CAPSULE ORAL
Qty: 90 | Refills: 1 | Status: DISCONTINUED | COMMUNITY

## 2025-08-07 RX ORDER — QUETIAPINE 25 MG/1
1 TABLET AT BEDTIME TABLET, FILM COATED ORAL ONCE A DAY
Status: ACTIVE | COMMUNITY

## 2025-08-07 RX ORDER — UPADACITINIB 45 MG/1
AS DIRECTED TABLET, EXTENDED RELEASE ORAL DAILY
Qty: 84 | Refills: 0 | OUTPATIENT
Start: 2025-08-11

## 2025-08-07 RX ORDER — ZOSTER VACCINE RECOMBINANT, ADJUVANTED 50 MCG/0.5
AS DIRECTED KIT INTRAMUSCULAR
Qty: 1 | Refills: 1 | OUTPATIENT
Start: 2025-08-07 | End: 2025-08-09

## 2025-08-07 RX ORDER — PANTOPRAZOLE SODIUM 40 MG/1
1 TABLET TABLET, DELAYED RELEASE ORAL ONCE A DAY
Qty: 30 TABLET | Refills: 3 | Status: DISCONTINUED | COMMUNITY

## 2025-08-07 RX ORDER — VALSARTAN 80 MG/1
1 TABLET TABLET, FILM COATED ORAL ONCE A DAY
Status: ACTIVE | COMMUNITY

## 2025-08-07 RX ORDER — LINACLOTIDE 145 UG/1
TAKE 1 CAPSULE BY MOUTH EVERY DAY AT LEAST 30 MINUTES BEFORE THE FIRST MEAL OF THE DAY ON AN EMPTY STOMACH CAPSULE, GELATIN COATED ORAL
Qty: 30 CAPSULE | Refills: 3 | Status: DISCONTINUED | COMMUNITY

## 2025-08-07 RX ORDER — SUCRALFATE 1 G/1
1 TABLET ON AN EMPTY STOMACH TABLET ORAL
Qty: 90 | Refills: 6 | Status: DISCONTINUED | COMMUNITY

## 2025-08-07 RX ORDER — PANTOPRAZOLE SODIUM 40 MG/1
1 TABLET 1/2 TO 1 HOUR BEFORE MEAL TABLET, DELAYED RELEASE ORAL TWICE A DAY
Qty: 60 | Refills: 3

## 2025-08-07 RX ORDER — ADALIMUMAB 40MG/0.8ML
0.8 ML KIT SUBCUTANEOUS EVERY 2 WEEKS
Qty: 1 | Refills: 11 | Status: DISCONTINUED | COMMUNITY

## 2025-08-07 RX ORDER — LOPERAMIDE HCL 2 MG
1 CAPSULE CAPSULE ORAL
Qty: 120 | Refills: 0 | Status: DISCONTINUED | COMMUNITY
Start: 2025-07-22 | End: 2025-08-21

## 2025-08-07 RX ORDER — ISOPROPYL ALCOHOL 91 ML/100ML
TAKE 1 CAPSULE AT BEDTIME LIQUID TOPICAL
Refills: 0 | Status: ACTIVE | COMMUNITY
Start: 2011-08-01

## 2025-08-07 RX ORDER — FAMOTIDINE 40 MG/1
1 TABLET AT BEDTIME TABLET, FILM COATED ORAL ONCE A DAY
Qty: 30 TABLET | Refills: 6 | Status: DISCONTINUED | COMMUNITY

## 2025-08-07 RX ORDER — PREDNISONE 10 MG/1
TAKE 4 TABLETS ORALLY ONCE A DAY WITH FOOD TABLET ORAL
Qty: 120 TABLET | Refills: 1 | Status: DISCONTINUED | COMMUNITY

## 2025-08-07 RX ORDER — POLYETHYLENE GLYCOL 3350, SODIUM CHLORIDE, SODIUM BICARBONATE, POTASSIUM CHLORIDE 420; 11.2; 5.72; 1.48 G/4L; G/4L; G/4L; G/4L
WHILE ON A CLEAR LIQUID DIET DRINK 8 OZ EVERY 15 MIN UNTIL GONE 2 DAYS BEFORE COLONOSCOPY AND DRINK SECOND BOTTLE AS DIRECTED ON PREP SHEET DAY BEFORE THE COLONOSCOPY POWDER, FOR SOLUTION ORAL
Qty: 2 PACK | Refills: 0 | Status: DISCONTINUED | COMMUNITY

## 2025-08-07 RX ORDER — CHLORDIAZEPOXIDE HYDROCHLORIDE AND CLIDINIUM BROMIDE 5; 2.5 MG/1; MG/1
1 CAPSULE BEFORE MEALS CAPSULE ORAL TWICE A DAY
Qty: 60 | Refills: 3 | OUTPATIENT
Start: 2025-08-07

## 2025-08-07 RX ORDER — FAMOTIDINE 20 MG/1
1 TABLET TABLET, FILM COATED ORAL TWICE A DAY
Qty: 60 TABLET | Refills: 3 | Status: DISCONTINUED | COMMUNITY

## 2025-08-07 RX ORDER — HYDROCHLOROTHIAZIDE 25 MG/1
1 TABLET IN THE MORNING TABLET ORAL ONCE A DAY
Status: DISCONTINUED | COMMUNITY

## 2025-08-08 ENCOUNTER — TELEPHONE ENCOUNTER (OUTPATIENT)
Dept: URBAN - METROPOLITAN AREA CLINIC 107 | Facility: CLINIC | Age: 50
End: 2025-08-08

## 2025-08-12 ENCOUNTER — TELEPHONE ENCOUNTER (OUTPATIENT)
Dept: URBAN - METROPOLITAN AREA CLINIC 107 | Facility: CLINIC | Age: 50
End: 2025-08-12

## 2025-08-12 ENCOUNTER — P2P PATIENT RECORD (OUTPATIENT)
Age: 50
End: 2025-08-12

## 2025-08-13 ENCOUNTER — P2P PATIENT RECORD (OUTPATIENT)
Age: 50
End: 2025-08-13

## 2025-08-13 ENCOUNTER — TELEPHONE ENCOUNTER (OUTPATIENT)
Dept: URBAN - METROPOLITAN AREA CLINIC 107 | Facility: CLINIC | Age: 50
End: 2025-08-13